# Patient Record
Sex: FEMALE | Race: BLACK OR AFRICAN AMERICAN | NOT HISPANIC OR LATINO
[De-identification: names, ages, dates, MRNs, and addresses within clinical notes are randomized per-mention and may not be internally consistent; named-entity substitution may affect disease eponyms.]

---

## 2017-06-13 ENCOUNTER — HOSPITAL (OUTPATIENT)
Dept: OTHER | Age: 32
End: 2017-06-13
Attending: OBSTETRICS & GYNECOLOGY

## 2017-06-13 LAB
ALBUMIN SERPL-MCNC: 2.3 GM/DL (ref 3.6–5.1)
ALBUMIN/GLOB SERPL: 0.5 {RATIO} (ref 1–2.4)
ALP SERPL-CCNC: 142 UNIT/L (ref 45–117)
ALT SERPL-CCNC: 16 UNIT/L
ANALYZER ANC (IANC): NORMAL
ANION GAP SERPL CALC-SCNC: 11 MMOL/L (ref 10–20)
APPEARANCE UR: CLEAR
AST SERPL-CCNC: 13 UNIT/L
BASOPHILS # BLD: 0 THOUSAND/MCL (ref 0–0.3)
BASOPHILS NFR BLD: 0 %
BILIRUB SERPL-MCNC: 0.2 MG/DL (ref 0.2–1)
BILIRUB UR QL STRIP: NEGATIVE
BUN SERPL-MCNC: 9 MG/DL (ref 6–20)
BUN/CREAT SERPL: 16 (ref 7–25)
CALCIUM SERPL-MCNC: 9.1 MG/DL (ref 8.4–10.2)
CHLORIDE: 105 MMOL/L (ref 98–107)
CO2 SERPL-SCNC: 26 MMOL/L (ref 21–32)
COLOR UR: YELLOW
CREAT SERPL-MCNC: 0.57 MG/DL (ref 0.51–0.95)
DIFFERENTIAL METHOD BLD: NORMAL
EOSINOPHIL # BLD: 0.1 THOUSAND/MCL (ref 0.1–0.5)
EOSINOPHIL NFR BLD: 1 %
ERYTHROCYTE [DISTWIDTH] IN BLOOD: 13.6 % (ref 11–15)
GLOBULIN SER-MCNC: 4.6 GM/DL (ref 2–4)
GLUCOSE SERPL-MCNC: 78 MG/DL (ref 65–99)
GLUCOSE UR STRIP-MCNC: NEGATIVE MG/DL
HEMATOCRIT: 37.4 % (ref 36–46.5)
HEMOCCULT STL QL: NEGATIVE
HGB BLD-MCNC: 12.7 GM/DL (ref 12–15.5)
KETONES UR STRIP-MCNC: NEGATIVE MG/DL
LEUKOCYTE ESTERASE UR QL STRIP: NEGATIVE
LYMPHOCYTES # BLD: 1.7 THOUSAND/MCL (ref 1–4.8)
LYMPHOCYTES NFR BLD: 17 %
MCH RBC QN AUTO: 30.9 PG (ref 26–34)
MCHC RBC AUTO-ENTMCNC: 34 GM/DL (ref 32–36.5)
MCV RBC AUTO: 91 FL (ref 78–100)
MONOCYTES # BLD: 0.7 THOUSAND/MCL (ref 0.3–0.9)
MONOCYTES NFR BLD: 7 %
NEUTROPHILS # BLD: 7.6 THOUSAND/MCL (ref 1.8–7.7)
NEUTROPHILS NFR BLD: 75 %
NEUTS SEG NFR BLD: NORMAL %
NITRITE UR QL STRIP: NEGATIVE
ORGANIC ACIDS/CREAT UR-SRTO: 40.18 MG/DL
PERCENT NRBC: NORMAL
PH UR STRIP: 6.5 UNIT (ref 5–7)
PLATELET # BLD: 244 THOUSAND/MCL (ref 140–450)
POTASSIUM SERPL-SCNC: 4.2 MMOL/L (ref 3.4–5.1)
PROT SERPL-MCNC: 6.9 GM/DL (ref 6.4–8.2)
PROT UR STRIP-MCNC: NEGATIVE MG/DL
PROT UR-MCNC: 7 MG/DL
PROT/CREAT UR: 174 MG/GM CREAT
RBC # BLD: 4.11 MILLION/MCL (ref 4–5.2)
SODIUM SERPL-SCNC: 138 MMOL/L (ref 135–145)
SP GR UR STRIP: 1.01 (ref 1–1.03)
URATE SERPL-MCNC: 2.2 MG/DL (ref 2.6–5.9)
UROBILINOGEN UR STRIP-MCNC: 0.2 MG/DL (ref 0–1)
WBC # BLD: 10.1 THOUSAND/MCL (ref 4.2–11)

## 2017-06-16 ENCOUNTER — HOSPITAL (OUTPATIENT)
Dept: OTHER | Age: 32
End: 2017-06-16
Attending: OBSTETRICS & GYNECOLOGY

## 2017-06-16 LAB
ALBUMIN SERPL-MCNC: 2.4 GM/DL (ref 3.6–5.1)
ALBUMIN/GLOB SERPL: 0.5 {RATIO} (ref 1–2.4)
ALP SERPL-CCNC: 146 UNIT/L (ref 45–117)
ALT SERPL-CCNC: 18 UNIT/L
ANALYZER ANC (IANC): ABNORMAL
ANION GAP SERPL CALC-SCNC: 13 MMOL/L (ref 10–20)
APPEARANCE UR: CLEAR
APTT PPP: 26 SECONDS (ref 22–30)
APTT PPP: NORMAL S
AST SERPL-CCNC: 12 UNIT/L
BASOPHILS # BLD: 0 THOUSAND/MCL (ref 0–0.3)
BASOPHILS NFR BLD: 0 %
BILIRUB SERPL-MCNC: 0.2 MG/DL (ref 0.2–1)
BILIRUB UR QL STRIP: NEGATIVE
BUN SERPL-MCNC: 6 MG/DL (ref 6–20)
BUN/CREAT SERPL: 10 (ref 7–25)
CALCIUM SERPL-MCNC: 9.1 MG/DL (ref 8.4–10.2)
CHLORIDE: 104 MMOL/L (ref 98–107)
CO2 SERPL-SCNC: 25 MMOL/L (ref 21–32)
COLOR UR: YELLOW
CREAT SERPL-MCNC: 0.58 MG/DL (ref 0.51–0.95)
DIFFERENTIAL METHOD BLD: ABNORMAL
EOSINOPHIL # BLD: 0.1 THOUSAND/MCL (ref 0.1–0.5)
EOSINOPHIL NFR BLD: 1 %
ERYTHROCYTE [DISTWIDTH] IN BLOOD: 13.4 % (ref 11–15)
GLOBULIN SER-MCNC: 4.5 GM/DL (ref 2–4)
GLUCOSE SERPL-MCNC: 77 MG/DL (ref 65–99)
GLUCOSE UR STRIP-MCNC: NEGATIVE MG/DL
HEMATOCRIT: 39.3 % (ref 36–46.5)
HEMOCCULT STL QL: NEGATIVE
HGB BLD-MCNC: 13.3 GM/DL (ref 12–15.5)
INR PPP: 0.9
KETONES UR STRIP-MCNC: NEGATIVE MG/DL
LEUKOCYTE ESTERASE UR QL STRIP: NEGATIVE
LYMPHOCYTES # BLD: 1.7 THOUSAND/MCL (ref 1–4.8)
LYMPHOCYTES NFR BLD: 14 %
MCH RBC QN AUTO: 30.4 PG (ref 26–34)
MCHC RBC AUTO-ENTMCNC: 33.8 GM/DL (ref 32–36.5)
MCV RBC AUTO: 89.7 FL (ref 78–100)
MONOCYTES # BLD: 1 THOUSAND/MCL (ref 0.3–0.9)
MONOCYTES NFR BLD: 8 %
NEUTROPHILS # BLD: 9.7 THOUSAND/MCL (ref 1.8–7.7)
NEUTROPHILS NFR BLD: 77 %
NEUTS SEG NFR BLD: ABNORMAL %
NITRITE UR QL STRIP: NEGATIVE
ORGANIC ACIDS/CREAT UR-SRTO: 53.28 MG/DL
PERCENT NRBC: ABNORMAL
PH UR STRIP: 7 UNIT (ref 5–7)
PLATELET # BLD: 280 THOUSAND/MCL (ref 140–450)
POTASSIUM SERPL-SCNC: 3.8 MMOL/L (ref 3.4–5.1)
PROT SERPL-MCNC: 6.9 GM/DL (ref 6.4–8.2)
PROT UR STRIP-MCNC: NEGATIVE MG/DL
PROT UR-MCNC: 11 MG/DL
PROT/CREAT UR: 206 MG/GM CREAT
PROTHROMBIN TIME: 9.9 SECONDS (ref 9.7–11.8)
PROTHROMBIN TIME: NORMAL
RBC # BLD: 4.38 MILLION/MCL (ref 4–5.2)
SODIUM SERPL-SCNC: 138 MMOL/L (ref 135–145)
SP GR UR STRIP: 1.02 (ref 1–1.03)
URATE SERPL-MCNC: 2.2 MG/DL (ref 2.6–5.9)
UROBILINOGEN UR STRIP-MCNC: 0.2 MG/DL (ref 0–1)
WBC # BLD: 12.4 THOUSAND/MCL (ref 4.2–11)

## 2017-06-17 LAB
BASE DEFICIT BLDCOA-SCNC: 1 MMOL/L
BASE DEFICIT BLDCOV-SCNC: NORMAL MMOL/L
BASE EXCESS BLDCOA CALC-SCNC: NORMAL MMOL/L
BASE EXCESS-RC: 0 MMOL/L
HCO3 BLDCOA-SCNC: 26 MMOL/L (ref 21–28)
HCO3 BLDCOV-SCNC: 25 MMOL/L (ref 22–29)
PCO2 BLDCOA: 55 MM HG (ref 31–74)
PCO2 BLDCOV: 43 MM HG (ref 23–49)
PH BLDCOA: 7.29 UNIT (ref 7.18–7.38)
PH BLDCOV: 7.38 UNIT (ref 7.25–7.45)
PO2 BLDCOV: 30 MM HG (ref 17–41)
PO2 RC ARTERIAL CORD (RACPO): <20 MM HG (ref 6–31)

## 2017-06-18 LAB
ANALYZER ANC (IANC): ABNORMAL
ERYTHROCYTE [DISTWIDTH] IN BLOOD: 13.5 % (ref 11–15)
HEMATOCRIT: 40.1 % (ref 36–46.5)
HGB BLD-MCNC: 13.5 GM/DL (ref 12–15.5)
MCH RBC QN AUTO: 30.8 PG (ref 26–34)
MCHC RBC AUTO-ENTMCNC: 33.7 GM/DL (ref 32–36.5)
MCV RBC AUTO: 91.3 FL (ref 78–100)
PLATELET # BLD: 259 THOUSAND/MCL (ref 140–450)
RBC # BLD: 4.39 MILLION/MCL (ref 4–5.2)
WBC # BLD: 12 THOUSAND/MCL (ref 4.2–11)

## 2017-08-09 ENCOUNTER — HOSPITAL (OUTPATIENT)
Dept: OTHER | Age: 32
End: 2017-08-09
Attending: OBSTETRICS & GYNECOLOGY

## 2019-01-18 ENCOUNTER — WALK IN (OUTPATIENT)
Dept: URGENT CARE | Age: 34
End: 2019-01-18

## 2019-01-18 VITALS
TEMPERATURE: 98 F | DIASTOLIC BLOOD PRESSURE: 96 MMHG | HEART RATE: 76 BPM | OXYGEN SATURATION: 99 % | SYSTOLIC BLOOD PRESSURE: 142 MMHG | RESPIRATION RATE: 16 BRPM

## 2019-01-18 DIAGNOSIS — R03.0 ELEVATED BLOOD-PRESSURE READING WITHOUT DIAGNOSIS OF HYPERTENSION: ICD-10-CM

## 2019-01-18 DIAGNOSIS — J01.40 ACUTE NON-RECURRENT PANSINUSITIS: Primary | ICD-10-CM

## 2019-01-18 PROCEDURE — 99203 OFFICE O/P NEW LOW 30 MIN: CPT | Performed by: NURSE PRACTITIONER

## 2019-01-18 RX ORDER — AZITHROMYCIN 250 MG/1
TABLET, FILM COATED ORAL
Qty: 6 TABLET | Refills: 0 | Status: SHIPPED | OUTPATIENT
Start: 2019-01-18

## 2019-01-18 ASSESSMENT — ENCOUNTER SYMPTOMS
ENDOCRINE NEGATIVE: 1
GASTROINTESTINAL NEGATIVE: 1
CHEST TIGHTNESS: 0
NEUROLOGICAL NEGATIVE: 1
ALLERGIC/IMMUNOLOGIC NEGATIVE: 1
SHORTNESS OF BREATH: 0
FEVER: 0
EYES NEGATIVE: 1
APPETITE CHANGE: 1
WEAKNESS: 0
ADENOPATHY: 0
WHEEZING: 0
COUGH: 0
PSYCHIATRIC NEGATIVE: 1
SINUS PRESSURE: 1
SORE THROAT: 1

## 2019-01-19 ENCOUNTER — TELEPHONE (OUTPATIENT)
Dept: SCHEDULING | Age: 34
End: 2019-01-19

## 2019-01-19 ENCOUNTER — DOCUMENTATION (OUTPATIENT)
Dept: URGENT CARE | Age: 34
End: 2019-01-19

## 2022-01-24 ENCOUNTER — HOSPITAL ENCOUNTER (OUTPATIENT)
Age: 37
Discharge: HOME | End: 2022-01-24
Payer: MEDICAID

## 2022-01-24 VITALS
SYSTOLIC BLOOD PRESSURE: 139 MMHG | OXYGEN SATURATION: 100 % | RESPIRATION RATE: 18 BRPM | DIASTOLIC BLOOD PRESSURE: 98 MMHG | TEMPERATURE: 99 F | HEART RATE: 98 BPM

## 2022-01-24 DIAGNOSIS — U07.1 COVID: ICD-10-CM

## 2022-01-24 DIAGNOSIS — Z20.822 ENCOUNTER FOR LABORATORY TESTING FOR COVID-19 VIRUS: Primary | ICD-10-CM

## 2022-01-24 LAB — SARS-COV-2 RNA RESP QL NAA+PROBE: DETECTED

## 2022-01-24 PROCEDURE — U0002 COVID-19 LAB TEST NON-CDC: HCPCS | Performed by: NURSE PRACTITIONER

## 2022-01-24 PROCEDURE — 99213 OFFICE O/P EST LOW 20 MIN: CPT | Performed by: NURSE PRACTITIONER

## 2022-01-24 NOTE — ED PROVIDER NOTES
Patient Seen in: [facility]      History   Patient presents with:  Headache    Stated Complaint: covid test/chills/fever/ha/    Subjective:   HPI    27-year-old female presents with headache that began yesterday.   Her daughter is positive for [id number]  [phone]                Medications Prescribed:  Discharge Medication List as of 1/24/2022 10:40 AM

## 2022-06-07 ENCOUNTER — WALK IN (OUTPATIENT)
Dept: URGENT CARE | Age: 37
End: 2022-06-07

## 2022-06-07 VITALS
HEART RATE: 80 BPM | SYSTOLIC BLOOD PRESSURE: 132 MMHG | DIASTOLIC BLOOD PRESSURE: 80 MMHG | TEMPERATURE: 98.6 F | RESPIRATION RATE: 16 BRPM

## 2022-06-07 DIAGNOSIS — J01.90 ACUTE BACTERIAL SINUSITIS: Primary | ICD-10-CM

## 2022-06-07 DIAGNOSIS — B96.89 ACUTE BACTERIAL SINUSITIS: Primary | ICD-10-CM

## 2022-06-07 PROCEDURE — 99213 OFFICE O/P EST LOW 20 MIN: CPT | Performed by: NURSE PRACTITIONER

## 2022-06-07 RX ORDER — AMOXICILLIN AND CLAVULANATE POTASSIUM 875; 125 MG/1; MG/1
1 TABLET, FILM COATED ORAL EVERY 12 HOURS
Qty: 20 TABLET | Refills: 0 | Status: SHIPPED | OUTPATIENT
Start: 2022-06-07 | End: 2022-06-17

## 2022-06-07 ASSESSMENT — ENCOUNTER SYMPTOMS
RESPIRATORY NEGATIVE: 1
HEADACHES: 0
FATIGUE: 1
SORE THROAT: 0
TROUBLE SWALLOWING: 0
DIZZINESS: 0
GASTROINTESTINAL NEGATIVE: 1
FEVER: 0
LIGHT-HEADEDNESS: 0
SINUS PRESSURE: 1
EYES NEGATIVE: 1
RHINORRHEA: 1

## 2022-11-11 ENCOUNTER — OFFICE VISIT (OUTPATIENT)
Dept: OBGYN CLINIC | Facility: CLINIC | Age: 37
End: 2022-11-11
Payer: MEDICAID

## 2022-11-11 VITALS
WEIGHT: 153.81 LBS | SYSTOLIC BLOOD PRESSURE: 142 MMHG | HEART RATE: 88 BPM | BODY MASS INDEX: 23 KG/M2 | DIASTOLIC BLOOD PRESSURE: 90 MMHG

## 2022-11-11 DIAGNOSIS — Z12.4 SCREENING FOR CERVICAL CANCER: ICD-10-CM

## 2022-11-11 DIAGNOSIS — Z01.419 WELL WOMAN EXAM WITH ROUTINE GYNECOLOGICAL EXAM: Primary | ICD-10-CM

## 2022-11-11 DIAGNOSIS — D21.9 FIBROIDS: ICD-10-CM

## 2022-11-11 DIAGNOSIS — G43.829 MENSTRUAL MIGRAINE WITHOUT STATUS MIGRAINOSUS, NOT INTRACTABLE: ICD-10-CM

## 2022-11-11 DIAGNOSIS — N94.10 DYSPAREUNIA IN FEMALE: ICD-10-CM

## 2022-11-11 DIAGNOSIS — I10 HYPERTENSION, UNSPECIFIED TYPE: ICD-10-CM

## 2022-11-11 PROCEDURE — 3080F DIAST BP >= 90 MM HG: CPT | Performed by: NURSE PRACTITIONER

## 2022-11-11 PROCEDURE — 99385 PREV VISIT NEW AGE 18-39: CPT | Performed by: NURSE PRACTITIONER

## 2022-11-11 PROCEDURE — 3077F SYST BP >= 140 MM HG: CPT | Performed by: NURSE PRACTITIONER

## 2022-11-12 PROBLEM — D21.9 FIBROIDS: Status: ACTIVE | Noted: 2022-11-12

## 2022-11-12 PROBLEM — I10 HYPERTENSION: Status: ACTIVE | Noted: 2022-11-12

## 2022-11-14 LAB — HPV I/H RISK 1 DNA SPEC QL NAA+PROBE: NEGATIVE

## 2022-11-23 ENCOUNTER — TELEPHONE (OUTPATIENT)
Dept: OBGYN CLINIC | Facility: CLINIC | Age: 37
End: 2022-11-23

## 2022-12-11 ENCOUNTER — HOSPITAL ENCOUNTER (OUTPATIENT)
Dept: ULTRASOUND IMAGING | Age: 37
Discharge: HOME | End: 2022-12-11
Attending: NURSE PRACTITIONER
Payer: MEDICAID

## 2022-12-11 ENCOUNTER — HOSPITAL ENCOUNTER (OUTPATIENT)
Dept: ULTRASOUND IMAGING | Age: 37
End: 2022-12-11
Attending: NURSE PRACTITIONER
Payer: MEDICAID

## 2022-12-11 DIAGNOSIS — D21.9 FIBROIDS: ICD-10-CM

## 2022-12-11 DIAGNOSIS — N94.10 DYSPAREUNIA IN FEMALE: ICD-10-CM

## 2022-12-11 PROCEDURE — 76856 US EXAM PELVIC COMPLETE: CPT | Performed by: NURSE PRACTITIONER

## 2022-12-11 PROCEDURE — 76830 TRANSVAGINAL US NON-OB: CPT | Performed by: NURSE PRACTITIONER

## 2023-09-01 ENCOUNTER — HOSPITAL ENCOUNTER (OUTPATIENT)
Age: 38
Discharge: HOME | End: 2023-09-01
Payer: MEDICAID

## 2023-09-01 VITALS
HEART RATE: 84 BPM | TEMPERATURE: 98 F | OXYGEN SATURATION: 99 % | DIASTOLIC BLOOD PRESSURE: 103 MMHG | SYSTOLIC BLOOD PRESSURE: 146 MMHG | RESPIRATION RATE: 20 BRPM

## 2023-09-01 DIAGNOSIS — J06.9 VIRAL UPPER RESPIRATORY TRACT INFECTION: Primary | ICD-10-CM

## 2023-09-01 DIAGNOSIS — Z20.822 ENCOUNTER FOR LABORATORY TESTING FOR COVID-19 VIRUS: ICD-10-CM

## 2023-09-01 LAB
S PYO AG THROAT QL: NEGATIVE
SARS-COV-2 RNA RESP QL NAA+PROBE: NOT DETECTED

## 2024-02-06 ENCOUNTER — WALK IN (OUTPATIENT)
Dept: URGENT CARE | Age: 39
End: 2024-02-06

## 2024-02-06 VITALS
RESPIRATION RATE: 18 BRPM | HEIGHT: 68 IN | HEART RATE: 73 BPM | OXYGEN SATURATION: 98 % | TEMPERATURE: 98.2 F | BODY MASS INDEX: 22.43 KG/M2 | DIASTOLIC BLOOD PRESSURE: 99 MMHG | SYSTOLIC BLOOD PRESSURE: 136 MMHG | WEIGHT: 148 LBS

## 2024-02-06 DIAGNOSIS — B96.89 ACUTE BACTERIAL RHINOSINUSITIS: Primary | ICD-10-CM

## 2024-02-06 DIAGNOSIS — J01.90 ACUTE BACTERIAL RHINOSINUSITIS: Primary | ICD-10-CM

## 2024-02-06 DIAGNOSIS — R03.0 ELEVATED BP WITHOUT DIAGNOSIS OF HYPERTENSION: ICD-10-CM

## 2024-02-06 PROCEDURE — 99213 OFFICE O/P EST LOW 20 MIN: CPT | Performed by: NURSE PRACTITIONER

## 2024-02-06 RX ORDER — AMOXICILLIN AND CLAVULANATE POTASSIUM 875; 125 MG/1; MG/1
1 TABLET, FILM COATED ORAL 2 TIMES DAILY
Qty: 20 TABLET | Refills: 0 | Status: SHIPPED | OUTPATIENT
Start: 2024-02-06 | End: 2024-02-16

## 2025-02-12 ENCOUNTER — APPOINTMENT (OUTPATIENT)
Dept: URBAN - METROPOLITAN AREA CLINIC 248 | Age: 40
Setting detail: DERMATOLOGY
End: 2025-02-12

## 2025-02-12 DIAGNOSIS — L70.0 ACNE VULGARIS: ICD-10-CM

## 2025-02-12 DIAGNOSIS — L81.4 OTHER MELANIN HYPERPIGMENTATION: ICD-10-CM

## 2025-02-12 PROCEDURE — 99203 OFFICE O/P NEW LOW 30 MIN: CPT

## 2025-02-12 PROCEDURE — OTHER PRESCRIPTION: OTHER

## 2025-02-12 PROCEDURE — OTHER ADDITIONAL NOTES: OTHER

## 2025-02-12 PROCEDURE — OTHER COUNSELING: OTHER

## 2025-02-12 PROCEDURE — OTHER MIPS QUALITY: OTHER

## 2025-02-12 RX ORDER — H-QUINONE/TRETINOIN/HYDROCORT 8 %-0.025%
EMULSION (GRAM) TOPICAL
Qty: 30 | Refills: 0 | Status: ERX | COMMUNITY
Start: 2025-02-12

## 2025-02-12 NOTE — HPI: PIMPLES (ACNE)
What Type Of Note Output Would You Prefer (Optional)?: Standard Output
How Severe Is Your Acne?: moderate
Is This A New Presentation, Or A Follow-Up?: Acne
Additional Comments (Use Complete Sentences): Pt PCP prescribed spironolactone for high BP and acne. Pt is also on anxiety medication.\\n

## 2025-02-12 NOTE — PROCEDURE: ADDITIONAL NOTES
Additional Notes: Pt picked up Kutaryaxm in office.
Render Risk Assessment In Note?: no
Detail Level: Simple
Additional Notes: Pt is taking spironolactone (50 mg 1x day) for past 2-3 weeks and is being managed by their PCP.

## 2025-02-12 NOTE — PROCEDURE: COUNSELING
Detail Level: Zone
Birth Control Pills Counseling: Birth Control Pill Counseling: I discussed with the patient the potential side effects of OCPs including but not limited to increased risk of stroke, heart attack, thrombophlebitis, deep venous thrombosis, hepatic adenomas, breast changes, GI upset, headaches, and depression.  The patient verbalized understanding of the proper use and possible adverse effects of OCPs. All of the patient's questions and concerns were addressed.
Topical Retinoid Pregnancy And Lactation Text: This medication is Pregnancy Category C. It is unknown if this medication is excreted in breast milk.
Topical Clindamycin Pregnancy And Lactation Text: This medication is Pregnancy Category B and is considered safe during pregnancy. It is unknown if it is excreted in breast milk.
Minocycline Pregnancy And Lactation Text: This medication is Pregnancy Category D and not consider safe during pregnancy. It is also excreted in breast milk.
Aklief counseling:  Patient advised to apply a pea-sized amount only at bedtime and wait 30 minutes after washing their face before applying.  If too drying, patient may add a non-comedogenic moisturizer.  The most commonly reported side effects including irritation, redness, scaling, dryness, stinging, burning, itching, and increased risk of sunburn.  The patient verbalized understanding of the proper use and possible adverse effects of retinoids.  All of the patient's questions and concerns were addressed.
Azelaic Acid Counseling: Patient counseled that medicine may cause skin irritation and to avoid applying near the eyes.  In the event of skin irritation, the patient was advised to reduce the amount of the drug applied or use it less frequently.   The patient verbalized understanding of the proper use and possible adverse effects of azelaic acid.  All of the patient's questions and concerns were addressed.
Dapsone Counseling: I discussed with the patient the risks of dapsone including but not limited to hemolytic anemia, agranulocytosis, rashes, methemoglobinemia, kidney failure, peripheral neuropathy, headaches, GI upset, and liver toxicity.  Patients who start dapsone require monitoring including baseline LFTs and weekly CBCs for the first month, then every month thereafter.  The patient verbalized understanding of the proper use and possible adverse effects of dapsone.  All of the patient's questions and concerns were addressed.
Erythromycin Counseling:  I discussed with the patient the risks of erythromycin including but not limited to GI upset, allergic reaction, drug rash, diarrhea, increase in liver enzymes, and yeast infections.
Tazorac Pregnancy And Lactation Text: This medication is not safe during pregnancy. It is unknown if this medication is excreted in breast milk.
Spironolactone Counseling: Patient advised regarding risks of diarrhea, abdominal pain, hyperkalemia, birth defects (for female patients), liver toxicity and renal toxicity. The patient may need blood work to monitor liver and kidney function and potassium levels while on therapy. The patient verbalized understanding of the proper use and possible adverse effects of spironolactone.  All of the patient's questions and concerns were addressed.
Azelaic Acid Pregnancy And Lactation Text: This medication is considered safe during pregnancy and breast feeding.
Azithromycin Counseling:  I discussed with the patient the risks of azithromycin including but not limited to GI upset, allergic reaction, drug rash, diarrhea, and yeast infections.
Winlevi Counseling:  I discussed with the patient the risks of topical clascoterone including but not limited to erythema, scaling, itching, and stinging. Patient voiced their understanding.
Sunscreen Recommendations: spf 30 - 50 daily with reapplication
Bactrim Counseling:  I discussed with the patient the risks of sulfa antibiotics including but not limited to GI upset, allergic reaction, drug rash, diarrhea, dizziness, photosensitivity, and yeast infections.  Rarely, more serious reactions can occur including but not limited to aplastic anemia, agranulocytosis, methemoglobinemia, blood dyscrasias, liver or kidney failure, lung infiltrates or desquamative/blistering drug rashes.
Isotretinoin Pregnancy And Lactation Text: This medication is Pregnancy Category X and is considered extremely dangerous during pregnancy. It is unknown if it is excreted in breast milk.
High Dose Vitamin A Pregnancy And Lactation Text: High dose vitamin A therapy is contraindicated during pregnancy and breast feeding.
Tetracycline Counseling: Patient counseled regarding possible photosensitivity and increased risk for sunburn.  Patient instructed to avoid sunlight, if possible.  When exposed to sunlight, patients should wear protective clothing, sunglasses, and sunscreen.  The patient was instructed to call the office immediately if the following severe adverse effects occur:  hearing changes, easy bruising/bleeding, severe headache, or vision changes.  The patient verbalized understanding of the proper use and possible adverse effects of tetracycline.  All of the patient's questions and concerns were addressed. Patient understands to avoid pregnancy while on therapy due to potential birth defects.
Benzoyl Peroxide Pregnancy And Lactation Text: This medication is Pregnancy Category C. It is unknown if benzoyl peroxide is excreted in breast milk.
Cleanser Recommendations: Gentle unscented cleanser such as cetaphil/cerave hydrating
Minocycline Counseling: Patient advised regarding possible photosensitivity and discoloration of the teeth, skin, lips, tongue and gums.  Patient instructed to avoid sunlight, if possible.  When exposed to sunlight, patients should wear protective clothing, sunglasses, and sunscreen.  The patient was instructed to call the office immediately if the following severe adverse effects occur:  hearing changes, easy bruising/bleeding, severe headache, or vision changes.  The patient verbalized understanding of the proper use and possible adverse effects of minocycline.  All of the patient's questions and concerns were addressed.
Topical Clindamycin Counseling: Patient counseled that this medication may cause skin irritation or allergic reactions.  In the event of skin irritation, the patient was advised to reduce the amount of the drug applied or use it less frequently.   The patient verbalized understanding of the proper use and possible adverse effects of clindamycin.  All of the patient's questions and concerns were addressed.
Tazorac Counseling:  Patient advised that medication is irritating and drying.  Patient may need to apply sparingly and wash off after an hour before eventually leaving it on overnight.  The patient verbalized understanding of the proper use and possible adverse effects of tazorac.  All of the patient's questions and concerns were addressed.
Aklief Pregnancy And Lactation Text: It is unknown if this medication is safe to use during pregnancy.  It is unknown if this medication is excreted in breast milk.  Breastfeeding women should use the topical cream on the smallest area of the skin for the shortest time needed while breastfeeding.  Do not apply to nipple and areola.
Topical Sulfur Applications Counseling: Topical Sulfur Counseling: Patient counseled that this medication may cause skin irritation or allergic reactions.  In the event of skin irritation, the patient was advised to reduce the amount of the drug applied or use it less frequently.   The patient verbalized understanding of the proper use and possible adverse effects of topical sulfur application.  All of the patient's questions and concerns were addressed.
Birth Control Pills Pregnancy And Lactation Text: This medication should be avoided if pregnant and for the first 30 days post-partum.
Doxycycline Pregnancy And Lactation Text: This medication is Pregnancy Category D and not consider safe during pregnancy. It is also excreted in breast milk but is considered safe for shorter treatment courses.
Erythromycin Pregnancy And Lactation Text: This medication is Pregnancy Category B and is considered safe during pregnancy. It is also excreted in breast milk.
Dapsone Pregnancy And Lactation Text: This medication is Pregnancy Category C and is not considered safe during pregnancy or breast feeding.
Sarecycline Counseling: Patient advised regarding possible photosensitivity and discoloration of the teeth, skin, lips, tongue and gums.  Patient instructed to avoid sunlight, if possible.  When exposed to sunlight, patients should wear protective clothing, sunglasses, and sunscreen.  The patient was instructed to call the office immediately if the following severe adverse effects occur:  hearing changes, easy bruising/bleeding, severe headache, or vision changes.  The patient verbalized understanding of the proper use and possible adverse effects of sarecycline.  All of the patient's questions and concerns were addressed.
Doxycycline Counseling:  Patient counseled regarding possible photosensitivity and increased risk for sunburn.  Patient instructed to avoid sunlight, if possible.  When exposed to sunlight, patients should wear protective clothing, sunglasses, and sunscreen.  The patient was instructed to call the office immediately if the following severe adverse effects occur:  hearing changes, easy bruising/bleeding, severe headache, or vision changes.  The patient verbalized understanding of the proper use and possible adverse effects of doxycycline.  All of the patient's questions and concerns were addressed.
Topical Sulfur Applications Pregnancy And Lactation Text: This medication is Pregnancy Category C and has an unknown safety profile during pregnancy. It is unknown if this topical medication is excreted in breast milk.
Isotretinoin Counseling: Patient should get monthly blood tests, not donate blood, not drive at night if vision affected, not share medication, and not undergo elective surgery for 6 months after tx completed. Side effects reviewed, pt to contact office should one occur.
Moisturizer Recommendations: cetaphil/cerave cream, neutrogena hydroboost
Use Enhanced Medication Counseling?: No
Winlevi Pregnancy And Lactation Text: This medication is considered safe during pregnancy and breastfeeding.
Spironolactone Pregnancy And Lactation Text: This medication can cause feminization of the male fetus and should be avoided during pregnancy. The active metabolite is also found in breast milk.
Benzoyl Peroxide Counseling: Patient counseled that medicine may cause skin irritation and bleach clothing.  In the event of skin irritation, the patient was advised to reduce the amount of the drug applied or use it less frequently.   The patient verbalized understanding of the proper use and possible adverse effects of benzoyl peroxide.  All of the patient's questions and concerns were addressed.
Azithromycin Pregnancy And Lactation Text: This medication is considered safe during pregnancy and is also secreted in breast milk.
Topical Retinoid counseling:  Patient advised to apply a pea-sized amount only at bedtime and wait 30 minutes after washing their face before applying.  If too drying, patient may add a non-comedogenic moisturizer. The patient verbalized understanding of the proper use and possible adverse effects of retinoids.  All of the patient's questions and concerns were addressed.
High Dose Vitamin A Counseling: Side effects reviewed, pt to contact office should one occur.
Bactrim Pregnancy And Lactation Text: This medication is Pregnancy Category D and is known to cause fetal risk.  It is also excreted in breast milk.
Detail Level: Simple

## 2025-02-27 ENCOUNTER — LAB ENCOUNTER (OUTPATIENT)
Dept: LAB | Facility: HOSPITAL | Age: 40
End: 2025-02-27
Attending: NURSE PRACTITIONER
Payer: MEDICAID

## 2025-02-27 ENCOUNTER — OFFICE VISIT (OUTPATIENT)
Dept: OBGYN CLINIC | Facility: CLINIC | Age: 40
End: 2025-02-27

## 2025-02-27 VITALS
WEIGHT: 148.81 LBS | HEART RATE: 94 BPM | SYSTOLIC BLOOD PRESSURE: 133 MMHG | BODY MASS INDEX: 22 KG/M2 | DIASTOLIC BLOOD PRESSURE: 87 MMHG

## 2025-02-27 DIAGNOSIS — Z01.419 WELL WOMAN EXAM WITH ROUTINE GYNECOLOGICAL EXAM: Primary | ICD-10-CM

## 2025-02-27 DIAGNOSIS — Z12.31 SCREENING MAMMOGRAM FOR BREAST CANCER: ICD-10-CM

## 2025-02-27 DIAGNOSIS — D21.9 FIBROIDS: ICD-10-CM

## 2025-02-27 DIAGNOSIS — N92.0 MENORRHAGIA WITH REGULAR CYCLE: ICD-10-CM

## 2025-02-27 DIAGNOSIS — Z12.4 SCREENING FOR CERVICAL CANCER: ICD-10-CM

## 2025-02-27 LAB
DEPRECATED RDW RBC AUTO: 47.2 FL (ref 35.1–46.3)
ERYTHROCYTE [DISTWIDTH] IN BLOOD BY AUTOMATED COUNT: 14.1 % (ref 11–15)
HCT VFR BLD AUTO: 38.8 %
HGB BLD-MCNC: 13 G/DL
MCH RBC QN AUTO: 30.4 PG (ref 26–34)
MCHC RBC AUTO-ENTMCNC: 33.5 G/DL (ref 31–37)
MCV RBC AUTO: 90.7 FL
PLATELET # BLD AUTO: 329 10(3)UL (ref 150–450)
RBC # BLD AUTO: 4.28 X10(6)UL
T3FREE SERPL-MCNC: 3.06 PG/ML (ref 2.4–4.2)
T4 FREE SERPL-MCNC: 1.1 NG/DL (ref 0.8–1.7)
TSI SER-ACNC: 0.53 UIU/ML (ref 0.55–4.78)
WBC # BLD AUTO: 5.8 X10(3) UL (ref 4–11)

## 2025-02-27 PROCEDURE — 84481 FREE ASSAY (FT-3): CPT | Performed by: NURSE PRACTITIONER

## 2025-02-27 PROCEDURE — 84439 ASSAY OF FREE THYROXINE: CPT | Performed by: NURSE PRACTITIONER

## 2025-02-27 PROCEDURE — 84443 ASSAY THYROID STIM HORMONE: CPT | Performed by: NURSE PRACTITIONER

## 2025-02-27 PROCEDURE — 36415 COLL VENOUS BLD VENIPUNCTURE: CPT

## 2025-02-27 PROCEDURE — 85027 COMPLETE CBC AUTOMATED: CPT

## 2025-02-27 PROCEDURE — 99395 PREV VISIT EST AGE 18-39: CPT | Performed by: NURSE PRACTITIONER

## 2025-02-27 RX ORDER — SPIRONOLACTONE 50 MG/1
50 TABLET, FILM COATED ORAL DAILY
COMMUNITY
Start: 2025-01-23 | End: 2025-04-23

## 2025-02-27 NOTE — PROGRESS NOTES
[facility]    Obstetrics and Gynecology    Chief Complaint   Patient presents with    Gyn Exam     Annual // Discuss possible fibroids per pt       Sonja Collins is a 39 year old female  Patient's last menstrual period was 2025 (approximate). presenting for annual gynecology exam.  Last seen 2022. Saw PCP in 2024.    On spironolactone for acne and also for BP (diastolic bp was elevated).    Prior to having her son she had 2 fibroids and heavy periods.post deliver states periods less heavy. In the last 9 months periods are heavier. Menses are monthly, menses last 5 days, first 2 days \"extremely heavy\" -she will use an ultra tampon and period underwear and within less than 30 min or an hour needs to change again. Also had mild cramping after her periods.  +painful periods, \"like having a baby\". Doesn't take pain meds because feels it makes bleeding heavier.    Hx of hemorrhoids - but noticing flare. She will notice tail bone pain when sitting. Normal bowel movements, occasional constipation.    Exercising regularly and weight training.    Sexually active with her , tubal ligation. Some bleeding with intercourse.    Pap:2018 ASCUS, HPV negative (records scanned in)   pap 2020 NILM, neg HPV  pap with EMB done 2022 NILM, neg human papillomavirus     Contraception:tubal ligation  Mammo: never    OBSTETRICS HISTORY:  OB History    Para Term  AB Living   2 2 2 0 0 2   SAB IAB Ectopic Multiple Live Births   0 0 0 0 2       GYNE HISTORY:  Menarche: 12 (2025  9:50 AM)  Period Cycle (Days): MONTHLY // 28 days (2025  9:50 AM)  Period Duration (Days): 5 DAYS (2025  9:50 AM)  Period Flow: Heavy (2025  9:50 AM)  Use of Birth Control (if yes, specify type): Tubal Ligation (2025  9:50 AM)  Pap Date: 22 (2025  9:50 AM)  Pap Result Notes: Neg Pap/HPV (2025  9:50 AM)  Follow Up Recommendation: Annual 22 EMB (2025  9:50  AM)      History   Sexual Activity    Sexual activity: Yes    Partners: Male           Latest Ref Rng & Units 11/11/2022     3:17 PM   RECENT PAP RESULTS   INTERPRETATION/RESULT: Negative for intraepithelial lesion or malignancy Negative for intraepithelial lesion or malignancy    HPV Negative Negative          MEDICAL HISTORY:  Past Medical History:    Anxiety    Dyspareunia    Fibroids    Hypertension     Past Surgical History:   Procedure Laterality Date    Insert intrauterine device      Remove intrauterine device      Tubal ligation         SOCIAL HISTORY:  Social History     Socioeconomic History    Marital status:      Spouse name: Not on file    Number of children: Not on file    Years of education: Not on file    Highest education level: Not on file   Occupational History    Not on file   Tobacco Use    Smoking status: Never    Smokeless tobacco: Not on file   Vaping Use    Vaping status: Never Used   Substance and Sexual Activity    Alcohol use: Yes     Alcohol/week: 1.0 standard drink of alcohol     Types: 1 Glasses of wine per week     Comment: OCC    Drug use: Never    Sexual activity: Yes     Partners: Male   Other Topics Concern    Not on file   Social History Narrative    Not on file     Social Drivers of Health     Food Insecurity: No Food Insecurity (1/23/2025)    Received from [facility]    Food Insecurity     Within the past 30 days, I worried whether my food would run out before I got money to buy more. / En los últimos 30 días, me preocupó que la comida se podía acabar antes de tener dinero para compr...: Never true / Nunca     Within the past 30 days, the food that I bought just didn't last, and I didn't have money to get more. / En los últimos 30 días, La comida que compré no rindió lo suficiente, y no tenía dinero para...: Never true / Nunca   Transportation Needs: Not on file   Stress: Not on file   Housing Stability: Low Risk  (11/16/2024)    [facility]    Housing Stability     Mortgage Payment Concerns?: Not on file     Number of Places Lived in the Last Year: Not on file     Unstable Housing?: Not on file         Depression Screening (PHQ-2/PHQ-9): Over the LAST 2 WEEKS   Little interest or pleasure in doing things: Not at all    Feeling down, depressed, or hopeless: Not at all    PHQ-2 SCORE: 0           FAMILY HISTORY:  Family History   Problem Relation Age of Onset    Hypertension Mother     Lipids Mother     Diabetes Father     Hypertension Father     Heart Disease Paternal Grandmother        MEDICATIONS:    Current Outpatient Medications:     spironolactone 50 MG Oral Tab, Take 1 tablet (50 mg total) by mouth daily., Disp: , Rfl:     ALLERGIES:  Allergies[1]      Review of Systems:  Constitutional:  Denies fatigue, night sweats, hot flashes  Eyes:  denies blurred or double vision  Cardiovascular:  denies chest pain or palpitations  Respiratory:  denies shortness of breath  Gastrointestinal:  denies heartburn, abdominal pain, diarrhea or constipation  Genitourinary:  denies dysuria, incontinence, abnormal vaginal discharge, vaginal itching, see HPI  Musculoskeletal:  denies back pain   Skin/Breast:  Denies any breast pain, lumps, or discharge.   Neurological:  denies headaches, extremity weakness or numbness.  Psychiatric: denies depression or anxiety.  Endocrine:   denies excessive thirst or urination.  Heme/Lymph:  denies history of anemia, easy bruising or bleeding.      PHYSICAL EXAM:     Vitals:    02/27/25 0953   BP: 133/87   Pulse: 94   Weight: 148 lb 12.8 oz (67.5 kg)       Body mass index is 22.3 kg/m².     Patient offered chaperone, patient declined    Constitutional: well developed, well nourished  Psychiatric:  Oriented to time, place, person and situation. Appropriate mood and affect  Head/Face: normocephalic  Neck/Thyroid: thyroid symmetric, no thyromegaly, no nodules, no adenopathy  Lymphatic:no abnormal  supraclavicular or axillary adenopathy is noted  Breast: normal without palpable masses, tenderness, asymmetry, nipple discharge, nipple retraction or skin changes  Abdomen:  soft, nontender, nondistended, no masses  Skin/Hair: no unusual rashes or bruises  Extremities: no edema, no cyanosis    Pelvic Exam:  External Genitalia: normal appearance, hair distribution, and no lesions  Urethral Meatus:  normal in size, location, without lesions and prolapse  Bladder:  No fullness, masses or tenderness  Vagina:  Normal appearance without lesions, no abnormal discharge  Cervix:  Normal without tenderness on motion  Uterus: 12 week size, irregular contour, position, mobility, without tenderness  Adnexa: normal without masses or tenderness  Perineum: normal  Anus: no hemorroids     Assessment & Plan:    ICD-10-CM    1. Well woman exam with routine gynecological exam  Z01.419       2. Screening for cervical cancer  Z12.4 ThinPrep PAP Smear     Hpv Dna  High Risk , Thin Prep Collect      3. Screening mammogram for breast cancer  Z12.31 FLAQUITO TANJA 2D+3D SCREENING BILAT (CPT=77067/10121)      4. Menorrhagia with regular cycle  N92.0 TSH W Reflex To Free T4     CBC, Platelet; No Differential     US PELVIS (TRANSABDOMINAL AND TRANSVAGINAL) (CPT=76856/21293)      5. Fibroids  D21.9 TSH W Reflex To Free T4     CBC, Platelet; No Differential         Reviewed ASCCP guidelines with the patient   Pap done today  Contraception: Using tubal ligation  Enlarged uterus - hx of fibroids - last ultrasound in 2022 she had a 1.3 cm posterior uterine fibroid.. repeat ultrasound ordered and labs  Breast Health:     Reviewed current guidelines with the patient and to start Mammograms at age 40  Reviewed monthly self breast exams with the patient   Discussed diet, exercise, MVIs with Ca/Vit D  Follow up in 1 yr for SHARON Armstrong    This note was prepared using Dragon Medical voice recognition dictation software. As a result errors may  occur. When identified these errors have been corrected. While every attempt is made to correct errors during dictation discrepancies may still exist.         [1] No Known Allergies

## 2025-02-28 DIAGNOSIS — N92.0 MENORRHAGIA WITH REGULAR CYCLE: Primary | ICD-10-CM

## 2025-02-28 DIAGNOSIS — D21.9 FIBROIDS: ICD-10-CM

## 2025-02-28 LAB — HPV E6+E7 MRNA CVX QL NAA+PROBE: NEGATIVE

## 2025-03-04 ENCOUNTER — PATIENT MESSAGE (OUTPATIENT)
Dept: OBGYN CLINIC | Facility: CLINIC | Age: 40
End: 2025-03-04

## 2025-03-04 ENCOUNTER — HOSPITAL ENCOUNTER (OUTPATIENT)
Dept: ULTRASOUND IMAGING | Age: 40
Discharge: HOME | End: 2025-03-04
Attending: NURSE PRACTITIONER
Payer: MEDICAID

## 2025-03-04 DIAGNOSIS — N92.0 MENORRHAGIA WITH REGULAR CYCLE: ICD-10-CM

## 2025-03-04 PROCEDURE — 76830 TRANSVAGINAL US NON-OB: CPT | Performed by: NURSE PRACTITIONER

## 2025-03-04 PROCEDURE — 76856 US EXAM PELVIC COMPLETE: CPT | Performed by: NURSE PRACTITIONER

## 2025-03-04 NOTE — TELEPHONE ENCOUNTER
Message to Sandra Caban.  Patient asking if ultrasound results from 2015 would be helpful.  They are in care everywhere under the other results tab.

## 2025-03-05 NOTE — TELEPHONE ENCOUNTER
Sonja sending message to note abdomen \"twitching.\"  This occurred when lying down last night. She states she was flat on her back and felt lower abdomen twitching and could visualize the area fluttering. She states it was like a baby kicking. She denies cramping or pain. This happened twice last night. This morning she felt a slight flutter, but today it was not strong enough to physically see a flutter.   She has tubal ligation.   Reviewed this may be muscle spasm. Reviewed hydration and motrin if bothersome.     To Sandra Caban to review and advise.

## 2025-03-11 ENCOUNTER — TELEPHONE (OUTPATIENT)
Dept: OBGYN CLINIC | Facility: CLINIC | Age: 40
End: 2025-03-11

## 2025-03-11 NOTE — TELEPHONE ENCOUNTER
Reviewed ultrasound. Please inform patient her fibroid has increased in size since last ultrasound in 2022- now 8.4cm and may have a submucosal component.  Due to her menstrual symptoms and other symptoms recommend consult with MD quintanilla Result    Narrative   PROCEDURE: US PELVIS TRANSABDOMINAL AND TRANSVAGINAL (CPT=76856/28938)     COMPARISON: None.     INDICATIONS: Menorrhagia with regular cycle     TECHNIQUE: Pelvic ultrasound using transabdominal and transvaginal technique.  A transvaginal scan was performed for endometrial and adnexal evaluation     FINDINGS: Enlarged lobulated fibroid uterus measuring 10.6 x 8.2 x 10.0 cm     ENDOMETRIUM: Thickened endometrium in the fundal region measuring 18.7 mm.  Mild submucosal effacement of the endometrium in the mid body and lower uterine segment  MYOMETRIUM: Large central heterogeneous uterine leiomyoma measuring 8.4 x 6.6 x 6.7 cm involving the body and fundus     OVARIES AND ADNEXA:     RIGHT:   Measures 3.7 x 2.4 x 3.2 cm.  Complex corpus luteum measures 3.9 x 3.7 cm.  LEFT:   Measures 4.1 x 2.3 x 2.4 cm Small subcentimeter physiologic follicles.  Benign follicular cyst measures 1.8 x 1.5 cm.     CUL-DE-SAC:   Normal.  No free fluid or mass.    OTHER: Negative.  Bladder appears normal.                 Impression   CONCLUSION:  1. Enlarged lobulated fibroid uterus.  2. Large central heterogeneous uterine leiomyoma measures 8.4 x 6.6 x 6.7 cm.    3. Thickened endometrium in the fundal region measuring 18.7 mm with mild submucosal effacement of the endometrium in the body and lower uterine segment.           Dictated by (CST): Christopher Monge MD on 3/10/2025 at 3:37 PM      Finalized by (CST): Christopher Monge MD on 3/10/2025 at 3:44 PM

## 2025-03-11 NOTE — TELEPHONE ENCOUNTER
Pt informed of EMBs recs and verbalized understanding. Pt stated she would prefer to see female MD. Next available 20 min slot with any female MD is with GODFREY in [place] on 4/21. Pt would prefer to be seen earlier then this.     Message to GODFREY, FABIANA, DIANA, and ABDELRAHMAN if pt can be added sooner then 4/21 for consult.

## 2025-04-23 NOTE — PROGRESS NOTES
The following individual(s) verbally consented to be recorded using ambient AI listening technology and understand that they can each withdraw their consent to this listening technology at any point by asking the clinician to turn off or pause the recording:    Patient name: Sonja Collins  Additional names:  BARRIE

## 2025-04-23 NOTE — PROGRESS NOTES
Sonja Collins    1985     History of Present Illness  Sonja Collins is a 39 year old female with fibroids who presents for discussion of ultrasound results and potential surgical options. She is here with her .    She has a history of fibroids, which were present during her pregnancies. Initially, she had two fibroids that grew during her first pregnancy, making it high risk. Post-pregnancy, she  for an extended period and did not have periods for a while. When her periods resumed, they were initially light with minimal cramping.    Over the past ten months, her menstrual bleeding has progressively worsened, becoming very heavy. She needs to change tampons in less than an hour during the heaviest days, which last about one and a half days, with her total period lasting five days. The remaining days involve moderate bleeding. She uses tampons and period pants for protection.    We reviewed her ultrasound report and it revealed an 8 cm fibroid on the posterior uterus, which is pushing into the uterine lining, causing heavy bleeding. She has a history of fibroids shrinking post-pregnancy.  We discussed  that this particular fibroid has grown significantly from 1 cm in  to 8 cm currently.    She experiences significant pain, including tailbone pain and cramping, which affects her ability to exercise and perform daily activities. The pain is sometimes dull and sometimes strong, impacting her quality of life.    She feels tired and suspects anemia due to the heavy bleeding, although she does not recall being told she is anemic. She has had her blood count checked recently but does not remember the results. Chart reviewed and her hgb in feb of this year was 13.0.       Chief Complaint   Patient presents with    Consult     DISCUSS ultrasound RESULTS       Past Medical History[1]    Past Surgical History[2]     Pap Date: 25  Pap Result Notes: Neg Pap/HPV  Follow Up Recommendation:  Annual  2/27/25 EMB      Medications Ordered Prior to Encounter[3]    Birth control method:tubal ligation      PHYSICAL EXAM:       Constitutional: well developed, well nourished  Head/Face: normocephalic  Psychiatric:  Oriented to time, place, person and situation. Appropriate mood and affect    Pelvic Exam:  deferred    Results      PROCEDURE: US PELVIS TRANSABDOMINAL AND TRANSVAGINAL (CPT=76856/65151)     COMPARISON: None.     INDICATIONS: Menorrhagia with regular cycle     TECHNIQUE: Pelvic ultrasound using transabdominal and transvaginal technique.  A transvaginal scan was performed for endometrial and adnexal evaluation     FINDINGS: Enlarged lobulated fibroid uterus measuring 10.6 x 8.2 x 10.0 cm     ENDOMETRIUM: Thickened endometrium in the fundal region measuring 18.7 mm.  Mild submucosal effacement of the endometrium in the mid body and lower uterine segment  MYOMETRIUM: Large central heterogeneous uterine leiomyoma measuring 8.4 x 6.6 x 6.7 cm involving the body and fundus     OVARIES AND ADNEXA:     RIGHT:   Measures 3.7 x 2.4 x 3.2 cm.  Complex corpus luteum measures 3.9 x 3.7 cm.  LEFT:   Measures 4.1 x 2.3 x 2.4 cm Small subcentimeter physiologic follicles.  Benign follicular cyst measures 1.8 x 1.5 cm.     CUL-DE-SAC:   Normal.  No free fluid or mass.    OTHER: Negative.  Bladder appears normal.                 Impression   CONCLUSION:  1. Enlarged lobulated fibroid uterus.  2. Large central heterogeneous uterine leiomyoma measures 8.4 x 6.6 x 6.7 cm.    3. Thickened endometrium in the fundal region measuring 18.7 mm with mild submucosal effacement of the endometrium in the body and lower uterine segment.           Dictated by (CST): Christopher Monge MD on 3/10/2025 at 3:37 PM            ICD-10-CM    1. Menorrhagia with regular cycle  N92.0       2. Submucous uterine fibroid  D25.0            Assessment & Plan  Uterine fibroids  uterine fibroid, 8 cm, causing submucosal distortion, severe  menorrhagia, cramping, tailbone pain. Surgical options discussed: myomectomy and hysterectomy. Myomectomy risks: bleeding, infection, fibroid regrowth, adhesions. Hysterectomy may have fewer bleeding risks, with possible ovarian preservation. Robotic-assisted myomectomy considered for less invasiveness. She is evaluating options.  - Consider myomectomy or hysterectomy based on her preference.  - Discuss robotic-assisted surgery with my partners if considering myomectomy.  - Educate on surgical risks and benefits, including adhesions and infection with myomectomy.  - Allow time for decision-making and schedule surgery for summer if decided.  - Advise no driving for two weeks post-surgery.  - Advise no sexual activity for eight weeks post-surgery.                  [1]   Past Medical History:   Anxiety    Dyspareunia    Fibroids    Hypertension   [2]   Past Surgical History:  Procedure Laterality Date    Insert intrauterine device      Remove intrauterine device      Tubal ligation     [3]   Current Outpatient Medications on File Prior to Visit   Medication Sig Dispense Refill    spironolactone 50 MG Oral Tab Take 1 tablet (50 mg total) by mouth daily.       No current facility-administered medications on file prior to visit.

## 2025-04-24 NOTE — TELEPHONE ENCOUNTER
OB GYN SURGICAL SCHEDULING    Assessment: menorrhagia, submucosal fibroid    Pre-Operative Procedure:  Salpingectomy Bilateral and Total Laprascopic Hysterectomy    Admission:  Day Surgery    Anesthesia: General    Additional Orders:  Routine Orders, patient needs to be aware that if fibroid too large to remove laparoscopy that there is a small chance that it may have to be converted to an open procedure.  This is true for all laparoscopic cases.      Comments / Orders to Nurse: will need to see her for a preop appointment at least 2 weeks before surgery    Discussed possible complications including but not limited to:  Alternatives to surgical intervention discussed with patient in detail., Likely consequences of not undergoing procedure discussed with patient., anesthesia risks, bleeding, infection, injury, bowel / bladder, injury, internal, postop DVT / PE, postop pneumonia, surgery may not improve symptoms, and wound dehiscence

## 2025-04-24 NOTE — TELEPHONE ENCOUNTER
Message to Dr. Corado.  See [employer] message regarding decision.  Patient would like a hysterectomy.

## 2025-04-24 NOTE — TELEPHONE ENCOUNTER
Spoke to patient. Aware surgery has been rescheduled to Mon,6/9/25 at 730a. Assisted with providing patient with consult appointment.     Will come in tomorrow to fill hysterectomy form.    To Dr. Li: please advise if available to assist    Updates instructions sent    Updated delayed staff message sent to MD    Updated book and calendar

## 2025-04-24 NOTE — TELEPHONE ENCOUNTER
Per verbal conversation with Dr. Corado OK to add on Mon,8/11/25.    Spoke to patient. Aware surgery is scheduled on Mon,8/11/25 at 730am. Patient aware depending on fibroid size case might be converted to an open procedure and verbalized understandings.     Assisted in scheduling consult appointment. With Dr. Corado 2weeks prior to surgery.    To Dr. Li: please advise if available to assist    Aware must come in and sign hysterectomy form no later than 1 before surgery  In order to check if prior authorization Is needed prior to surgery.    Major case and antimicrobial wash instructions sent via [employer]     Major case and antimicribial wash instructions sent via [employer]    Delayed staff message sent to MD to please place pre-op orders    Entered in book and calendar

## 2025-05-05 NOTE — PROGRESS NOTES
Sonja Collins    1985     History of Present Illness  Sonja Collins is a 39 year old female with migraines and fibroids who presents for a pre-operative consultation for a scheduled hysterectomy.    She has been experiencing a persistent migraine for the past seven days. Her migraines typically occur around her menstrual cycle, usually a day or two before her period starts, and they usually resolve once her flow begins. However, this time the headache has persisted despite the onset of her period. Her period arrived earlier than expected and was notably heavy. She has been monitoring her blood pressure at home, which has not been elevated. I advised her to see her pcp because her BP is elevated today.  She took her BP medication only shortly before she arrived today when she should have taken it in the morning.    She is scheduled for a hysterectomy on  due to a large fibroid. She is concerned about the timing of the surgery in relation to her anniversary on , as she was planning a renewal ceremony.     PSH:  She has a history of a tubal ligation performed in , which involved burning and cutting the tubes.                Chief Complaint   Patient presents with    Consult     Surgery next month and has had a migraine for 7 days - per PT       Past Medical History[1]none    Past Surgical History[2]Tubal ligation     Pap Date: 25  Pap Result Notes: Neg Pap/HPV  Follow Up Recommendation: Annual  25 EMB      Medications Ordered Prior to Encounter[3]    Birth control method:tubal ligation      PHYSICAL EXAM:       Constitutional: well developed, well nourished  Head/Face: normocephalic  Psychiatric:  Oriented to time, place, person and situation. Appropriate mood and affect    Pelvic Exam:  deferred    Results    US PELVIS (TRANSABDOMINAL AND TRANSVAGINAL) (CPT=76856/79523)  Narrative: PROCEDURE: US PELVIS TRANSABDOMINAL AND TRANSVAGINAL (CPT=76856/61854)      COMPARISON: None.     INDICATIONS: Menorrhagia with regular cycle     TECHNIQUE: Pelvic ultrasound using transabdominal and transvaginal technique.  A transvaginal scan was performed for endometrial and adnexal evaluation     FINDINGS:   Enlarged lobulated fibroid uterus measuring 10.6 x 8.2 x 10.0 cm     ENDOMETRIUM: Thickened endometrium in the fundal region measuring 18.7 mm.  Mild submucosal effacement of the endometrium in the mid body and lower uterine segment   MYOMETRIUM: Large central heterogeneous uterine leiomyoma measuring 8.4 x 6.6 x 6.7 cm involving the body and fundus      OVARIES AND ADNEXA:      RIGHT:   Measures 3.7 x 2.4 x 3.2 cm.  Complex corpus luteum measures 3.9 x 3.7 cm.   LEFT:   Measures 4.1 x 2.3 x 2.4 cm Small subcentimeter physiologic follicles.  Benign follicular cyst measures 1.8 x 1.5 cm.     CUL-DE-SAC:   Normal.  No free fluid or mass.    OTHER: Negative.  Bladder appears normal.                Impression: CONCLUSION:    1. Enlarged lobulated fibroid uterus.  2. Large central heterogeneous uterine leiomyoma measures 8.4 x 6.6 x 6.7 cm.    3. Thickened endometrium in the fundal region measuring 18.7 mm with mild submucosal effacement of the endometrium in the body and lower uterine segment.           Dictated by (CST): Christopher Monge MD on 3/10/2025 at 3:37 PM       Finalized by (CST): Christopher Monge MD on 3/10/2025 at 3:44 PM                      ICD-10-CM    1. Fibroids, submucosal  D25.0       2. Pelvic pain  R10.2       3. Menorrhagia with regular cycle  N92.0            Assessment & Plan  Menorrhagia due to submucosal fibroid  Menorrhagia attributed to submucosal fibroid causing significant pain and heavy bleeding. Hysterectomy scheduled for June 9. Discussed surgical risks, including 0.028% risk of ureteral injury, eight-week recovery, and potential intraoperative method change.  - Proceed with scheduled hysterectomy on June 9.  - Discuss potential for earlier  surgery date if feasible.  - Provided pre-operative instructions and consent forms.  - Discussed post-operative care and recovery expectations.  - Consider pelvic floor physical therapy post-recovery.  - Ensure adequate post-operative pain management.  - Advise on dietary recommendations including probiotics, protein, and fiber intake during recovery.      Migraine  Migraines associated with menstrual cycles, potentially exacerbated by fibroid. Advised to manage with medication at symptom onset. Caffeine and Tylenol suggested as initial treatments.  - Advise taking two extra strength Tylenol and caffeine at the onset of a migraine.  - Discuss migraine management with primary care provider for potential preventive medication.                [1]   Past Medical History:   Anxiety    Dyspareunia    Fibroids    Hypertension   [2]   Past Surgical History:  Procedure Laterality Date    Insert intrauterine device      Remove intrauterine device      Tubal ligation     [3]   Current Outpatient Medications on File Prior to Visit   Medication Sig Dispense Refill    spironolactone 50 MG Oral Tab Take 1 tablet (50 mg total) by mouth daily.       No current facility-administered medications on file prior to visit.

## 2025-05-15 NOTE — TELEPHONE ENCOUNTER
Patient called wants to speak with a nurse regarding her upcoming surgery she ask if someone would give her a call back

## 2025-05-15 NOTE — TELEPHONE ENCOUNTER
Patient informed of Dr. Corado's recommendations and verbalized understanding.   The patient's goals for the shift include      The clinical goals for the shift include Pt will remain free from falls throughout this shift.    Problem: Fall/Injury  Goal: Not fall by end of shift  Outcome: Progressing  Goal: Be free from injury by end of the shift  Outcome: Progressing  Goal: Verbalize understanding of personal risk factors for fall in the hospital  Outcome: Progressing  Goal: Verbalize understanding of risk factor reduction measures to prevent injury from fall in the home  Outcome: Progressing  Goal: Use assistive devices by end of the shift  Outcome: Progressing  Goal: Pace activities to prevent fatigue by end of the shift  Outcome: Progressing

## 2025-05-15 NOTE — TELEPHONE ENCOUNTER
Patient can see her pcp to rule out other possible causes of her hip pain and lower back pain as her uterus may not be causing this.

## 2025-06-09 NOTE — ANESTHESIA POSTPROCEDURE EVALUATION
Patient: Sonja Collins    Procedure Summary       Date: 06/09/25 Room / Location: [facility] MAIN OR 08 / [facility] MAIN OR    Anesthesia Start: 0738 Anesthesia Stop: 1058    Procedure: Total laparoscopic hysterectomy, bilateral salpingectomy (Abdomen) Diagnosis:       Menorrhagia with regular cycle      Fibroids, submucosal      (Menorrhagia with regular cycle [N92.0]Fibroids, submucosal [D25.0])    Surgeons: Juana Corado MD Anesthesiologist: Sofie Osman MD    Anesthesia Type: general ASA Status: 2            Anesthesia Type: general    Vitals Value Taken Time   /97 06/09/25 11:36   Temp 97.6 °F (36.4 °C) 06/09/25 11:00   Pulse 93 06/09/25 11:37   Resp 11 06/09/25 11:37   SpO2 99 % 06/09/25 11:37   Vitals shown include unfiled device data.    EM AN Post Evaluation:   Patient Evaluated in PACU  Patient Participation: complete - patient participated  Level of Consciousness: awake  Pain Management: adequate  Airway Patency:patent  Yes    Nausea/Vomiting: none  Cardiovascular Status: acceptable  Respiratory Status: acceptable  Postoperative Hydration acceptable      Sofie Osman MD  6/9/2025 11:37 AM

## 2025-06-09 NOTE — ANESTHESIA PREPROCEDURE EVALUATION
Anesthesia PreOp Note    HPI:     Sonja Collins is a 39 year old female who presents for preoperative consultation requested by: Juana Corado MD    Date of Surgery: 2025    Procedure(s):  Total laparoscopic hysterectomy, bilateral salpingectomy  Indication: Menorrhagia with regular cycle [N92.0]  Fibroids, submucosal [D25.0]    Relevant Problems   No relevant active problems       NPO:  Last Liquid Consumption Date: 25  Last Liquid Consumption Time: 2200  Last Solid Consumption Date: 25  Last Solid Consumption Time: 1700  Last Liquid Consumption Date: 25          History Review:  Patient Active Problem List    Diagnosis Date Noted    Menorrhagia with regular cycle 2025    Fibroids, submucosal 2025    Hypertension 2022    Fibroids 2022    Uterine fibroid complicating  care, baby not yet delivered in second trimester (HCC) 2015       Past Medical History[1]    Past Surgical History[2]    Prescriptions Prior to Admission[3]  Current Medications and Prescriptions Ordered in Epic[4]    Allergies[5]    Family History[6]  Social Hx on file[7]    Available pre-op labs reviewed.  Lab Results   Component Value Date    URINEPREG Negative 2025             Vital Signs:  Body mass index is 23.18 kg/m².   height is 1.702 m (5' 7\") and weight is 67.1 kg (148 lb). Her oral temperature is 98.1 °F (36.7 °C). Her blood pressure is 142/90 and her pulse is 95. Her respiration is 16 and oxygen saturation is 100%.   Vitals:    25 1547 25 0613   BP:  142/90   Pulse:  95   Resp:  16   Temp:  98.1 °F (36.7 °C)   TempSrc:  Oral   SpO2:  100%   Weight: 65.8 kg (145 lb) 67.1 kg (148 lb)   Height: 1.702 m (5' 7\") 1.702 m (5' 7\")        Anesthesia Evaluation     Patient summary reviewed and Nursing notes reviewed    No history of anesthetic complications   Airway   Mallampati: I  TM distance: >3 FB  Neck ROM: full  Dental      Pulmonary - negative ROS    Cardiovascular - normal exam  (+) hypertension    Neuro/Psych    (+)  anxiety/panic attacks,        GI/Hepatic/Renal      Endo/Other    Abdominal                  Anesthesia Plan:   ASA:  2  Plan:   General  Informed Consent Plan and Risks Discussed With:  Patient      I have informed Sonja Collins and/or legal guardian or family member of the nature of the anesthetic plan, benefits, risks including possible dental damage if relevant, major complications, and any alternative forms of anesthetic management.   All of the patient's questions were answered to the best of my ability. The patient desires the anesthetic management as planned.  Sofie Osman MD  6/9/2025 7:14 AM  Present on Admission:   Fibroids, submucosal   Menorrhagia with regular cycle           [1]   Past Medical History:   Anxiety    Dyspareunia    Fibroids    Hypertension    Migraines   [2]   Past Surgical History:  Procedure Laterality Date    Insert intrauterine device      Remove intrauterine device      Tubal ligation     [3]   Medications Prior to Admission   Medication Sig Dispense Refill Last Dose/Taking    verapamil 80 MG Oral Tab Take 1 tablet (80 mg total) by mouth 3 (three) times daily.   6/9/2025 at  3:00 AM    hydrOXYzine 25 MG Oral Tab Take 1 tablet (25 mg total) by mouth every 8 (eight) hours as needed for Anxiety.   6/8/2025 at 11:00 PM    amoxicillin clavulanate 875-125 MG Oral Tab Take 1 tablet by mouth 2 (two) times daily.   6/7/2025    spironolactone 50 MG Oral Tab Take 1 tablet (50 mg total) by mouth daily.   6/8/2025 at  9:00 AM    SUMAtriptan 25 MG Oral Tab Take 1 tablet (25 mg total) by mouth every 2 (two) hours as needed.   Unknown   [4]   Current Facility-Administered Medications Ordered in Epic   Medication Dose Route Frequency Provider Last Rate Last Admin    lactated ringers infusion   Intravenous Continuous Juana Corado MD 20 mL/hr at 06/09/25 0622 New Bag at 06/09/25 0622     No current [facility]-ordered outpatient  medications on file.   [5] No Known Allergies  [6]   Family History  Problem Relation Age of Onset    Diabetes Father     Hypertension Father     Hypertension Mother     Lipids Mother     No Known Problems Daughter     No Known Problems Son     Heart Disease Paternal Grandmother     No Known Problems Brother     Breast Cancer Neg     Ovarian Cancer Neg     Pancreatic Cancer Neg    [7]   Social History  Socioeconomic History    Marital status:    Tobacco Use    Smoking status: Never   Vaping Use    Vaping status: Never Used   Substance and Sexual Activity    Alcohol use: Yes     Alcohol/week: 1.0 standard drink of alcohol     Types: 1 Glasses of wine per week     Comment: OCC    Drug use: Never    Sexual activity: Yes     Partners: Male

## 2025-06-09 NOTE — ANESTHESIA PROCEDURE NOTES
Airway  Date/Time: 6/9/2025 7:46 AM  Reason: Elective    Airway not difficult    General Information and Staff   Patient location during procedure: OR  Anesthesiologist: Sofie Osman MD  Performed: anesthesiologist   Performed by: Sofie Osman MD  Authorized by: Sofie Osman MD        Indications and Patient Condition  Indications for airway management: anesthesia  Sedation level: deep      Preoxygenated: yesPatient position: sniffing    Mask difficulty assessment: 1 - vent by mask    Final Airway Details    Final airway type: endotracheal airway    Successful airway: ETT  Cuffed: yes   Successful intubation technique: Video laryngoscopy  Facilitating devices/methods: intubating stylet  Endotracheal tube insertion site: oral  Blade: GlideScope  Blade size: #3  ETT size (mm): 7.0    Cormack-Lehane Classification: grade I - full view of glottis  Placement verified by: capnometry   Measured from: teeth  Number of attempts at approach: 1

## 2025-06-09 NOTE — DISCHARGE SUMMARY
[facility]  part of [facility]    Discharge Summary    Sonja Collins Patient Status:  Outpatient in a Bed    1985 MRN [id number]   Location [facility] POST ANESTHESIA CARE UNIT Attending Juana Corado MD   Hosp Day # 0 PCP JHONATHAN HOFFMAN     Date of Admission: 2025   Date of Discharge: 6/10/2025    Admitting Diagnosis: Menorrhagia with regular cycle [N92.0]  Fibroids, submucosal [D25.0]  S/P laparoscopic hysterectomy    Disposition: Home and Self - care    Discharge Diagnosis: .Active Problems:    Fibroids, submucosal    Menorrhagia with regular cycle    S/P laparoscopic hysterectomy      Hospital Course:   Reason for Admission: laparoscopic hysterectomy    Discharge Physical Exam: General appearance:  alert, appears stated age and cooperative  INCISION/WOUND: clean, dry and intact, no evidence of infection and no evidence of cellulitis  Pulmonary: clear to auscultation bilaterally  Cardiovascular: S1, S2 normal, no murmur, click, rub or gallop, regular rate and rhythm  Abdominal: soft, appropriately tender; bowel sounds normal  Pelvic: no vaginal bleeding  Skin: Skin color, texture, turgor normal. No rashes or lesions  Neurologic: Grossly normal    Hospital Course: unremarkable, pt discharged home in stable condition    Complications: None      Surgical Procedures       Case IDs Date Procedure Surgeon Location Status    [id number] 25 Total laparoscopic hysterectomy, bilateral salpingectomy Juana Corado MD [facility] MAIN OR [facility]          Pending Labs       Order Current Status    Specimen to Pathology Tissue Collected (25 1030)            Discharge Plan:   Discharge Condition: Good    Current Discharge Medication List        Home Meds - Unchanged    Details   verapamil 80 MG Oral Tab Take 1 tablet (80 mg total) by mouth 3 (three) times daily.      hydrOXYzine 25 MG Oral Tab Take 1 tablet (25 mg total) by mouth every 8 (eight) hours as needed for Anxiety.       amoxicillin clavulanate 875-125 MG Oral Tab Take 1 tablet by mouth 2 (two) times daily.      spironolactone 50 MG Oral Tab Take 1 tablet (50 mg total) by mouth daily.      SUMAtriptan 25 MG Oral Tab Take 1 tablet (25 mg total) by mouth every 2 (two) hours as needed.                 Discharge Diet: As tolerated and General diet    Discharge Activity: Pelvic rest until cleared, No Driving until cleared, May shower, and nothing per vagina for 8 weeks    Follow up:       2 weeks postop visit        Juana Corado MD  6/9/2025

## 2025-06-09 NOTE — H&P
[facility]  part of [facility]    Gyne History & Physical    Sonja Collins Patient Status:  Outpatient in a Bed    1985 MRN [id number]   Location [facility] OPERATING ROOM Attending Juana Corado MD   Hosp Day # 0 PCP JHONATHAN HOFFMAN     Date of Admission: 2025    HPI:   Reason for Admission:  Total Laparoscopic Hysterectomy/BS    History of Present Illness:  Patient is a(n) 39 year old  female Patient's last menstrual period was 2025 (exact date). who presented with submucosal fibroid and heavy menses.  Her fibroid is large 8 cm- significantly larger than on her pelvic ultrasound done in  when it was only 1cm.  We discussed risks,benefits and possible complications of myomectomy vs hysterectomy and patient opted for hysterectomy due to no desire for future childbearing.  We also discussed that we may need to convert to an open hysterectomy if the large fibroid makes laparoscopic surgery not possible.  Patient consented to procedure.  Postop course also discussed in detail including risks of DVT,pneumonia, poor wound healing and need for future surgery.      Allergies/Medications:   Allergies:  Allergies[1]     Medications:  Prescriptions Prior to Admission[2]  History     Past Medical History:  Past Medical History[3]    Past Surgical History:  Past Surgical History[4]    Past OB History:  OB History    Para Term  AB Living   2 2 2   2   SAB IAB Ectopic Multiple Live Births       2      # Outcome Date GA Lbr Robert/2nd Weight Sex Type Anes PTL Lv   2 Term     F NORMAL SPONT   LORENZO   1 Term     M NORMAL SPONT   LORENZO        Past GYN History:       No history of abnormal paps or stds    Social History:  Social History     Tobacco Use    Smoking status: Never    Smokeless tobacco: Not on file   Substance Use Topics    Alcohol use: Yes     Alcohol/week: 1.0 standard drink of alcohol     Types: 1 Glasses of wine per week     Comment: OCC         Review of Systems:     no breast pain or new or enlarging lumps on self exam and cyclic withdrawal menses only  Cardiovascular: denies, edema, chest pain, orthopnea, and palpitations  Respiratory: denies, sputum production, pleuritic pain, shortness of breath, and wheezing  Neurologic: denies and pre-existing deficit  Psychiatric: past h/o anxiety- no meds    Physical Exam:      No intake or output data in the 24 hours ending 06/08/25 2106      Constitutional: feeling well, no fever, chills or malaise  Abdomen:  Soft, non tender  Genitourinary: deferred for EUA  Psychiatric: calm, alert and oriented x 3      Results:   Laboratory:  No results found for this or any previous visit (from the past 24 hours).  Lab Results   Component Value Date    WBC 5.8 02/27/2025    RBC 4.28 02/27/2025    HGB 13.0 02/27/2025    HCT 38.8 02/27/2025    MCV 90.7 02/27/2025    MCH 30.4 02/27/2025    MCHC 33.5 02/27/2025    RDW 14.1 02/27/2025    .0 02/27/2025      Component  Ref Range & Units 5/25/25  4:49 PM   Sodium  133 - 144 mmol/L 136   Potassium  3.5 - 5.1 mmol/L 4.3   Comment: Slight hemolysis is present at a level that could interfere with the result   Chloride  98 - 107 mmol/L 103   Carbon Dioxide  21.0 - 31.0 mmol/L 23.2   Anion Gap  6 - 15 mmol/L 10   BUN  7.0 - 25.0 mg/dL 14.5   Creatinine  0.60 - 1.20 mg/dL 0.84   Glucose  70 - 99 mg/dL 83   Calcium  8.6 - 10.3 mg/dL 9.2   AST  13 - 39 U/L 26   Comment: Slight hemolysis is present at a level that could interfere with the result   ALT  7 - 52 U/L 14   Alkaline Phosphatase  34 - 104 U/L 57   Protein, Total  6.4 - 8.9 g/dL 7.7   Albumin  3.50 - 5.70 g/dL 4.5   Bilirubin, Total  0.20 - 1.20 mg/dL 0.44   eGFR  >=60.0 mL/min/ 90.8             Diagnostics:  No results found.    PROCEDURE: US PELVIS TRANSABDOMINAL AND TRANSVAGINAL (CPT=76856/80364)     COMPARISON: None.     INDICATIONS: Menorrhagia with regular cycle     TECHNIQUE: Pelvic ultrasound using transabdominal  and transvaginal technique.  A transvaginal scan was performed for endometrial and adnexal evaluation     FINDINGS: Enlarged lobulated fibroid uterus measuring 10.6 x 8.2 x 10.0 cm     ENDOMETRIUM: Thickened endometrium in the fundal region measuring 18.7 mm.  Mild submucosal effacement of the endometrium in the mid body and lower uterine segment  MYOMETRIUM: Large central heterogeneous uterine leiomyoma measuring 8.4 x 6.6 x 6.7 cm involving the body and fundus     OVARIES AND ADNEXA:     RIGHT:   Measures 3.7 x 2.4 x 3.2 cm.  Complex corpus luteum measures 3.9 x 3.7 cm.  LEFT:   Measures 4.1 x 2.3 x 2.4 cm Small subcentimeter physiologic follicles.  Benign follicular cyst measures 1.8 x 1.5 cm.     CUL-DE-SAC:   Normal.  No free fluid or mass.    OTHER: Negative.  Bladder appears normal.                 Impression   CONCLUSION:  1. Enlarged lobulated fibroid uterus.  2. Large central heterogeneous uterine leiomyoma measures 8.4 x 6.6 x 6.7 cm.    3. Thickened endometrium in the fundal region measuring 18.7 mm with mild submucosal effacement of the endometrium in the body and lower uterine segment.               Assessment/Plan:     No problems updated.    Problem   Menorrhagia With Regular Cycle   Fibroids, Submucosal        Plan:  Total Laparoscopic Hysterectomy/Bilateral Salpingectomy, possible CELSA    All of the findings and plan were discussed with the patient.  She notes understanding and agrees with the plan of care.  All questions were answered to the best of my ability at this time.    Juana Corado MD  6/8/2025  9:06 PM         [1] No Known Allergies  [2]   No medications prior to admission.   [3]   Past Medical History:   Anxiety    Dyspareunia    Fibroids    Hypertension    Migraines   [4]   Past Surgical History:  Procedure Laterality Date    Insert intrauterine device      Remove intrauterine device      Tubal ligation

## 2025-06-10 NOTE — PLAN OF CARE
Problem: Patient Centered Care  Goal: Patient preferences are identified and integrated in the patient's plan of care  Description: Interventions:  - What would you like us to know as we care for you?   - Provide timely, complete, and accurate information to patient/family  - Incorporate patient and family knowledge, values, beliefs, and cultural backgrounds into the planning and delivery of care  - Encourage patient/family to participate in care and decision-making at the level they choose  - Honor patient and family perspectives and choices  Outcome: Progressing     Problem: Patient/Family Goals  Goal: Patient/Family Long Term Goal  Description: Patient's Long Term Goal:     Interventions:  -   - See additional Care Plan goals for specific interventions  Outcome: Progressing  Goal: Patient/Family Short Term Goal  Description: Patient's Short Term Goal:     Interventions:   -   - See additional Care Plan goals for specific interventions  Outcome: Progressing     Problem: PAIN - ADULT  Goal: Verbalizes/displays adequate comfort level or patient's stated pain goal  Description: INTERVENTIONS:  - Encourage pt to monitor pain and request assistance  - Assess pain using appropriate pain scale  - Administer analgesics based on type and severity of pain and evaluate response  - Implement non-pharmacological measures as appropriate and evaluate response  - Consider cultural and social influences on pain and pain management  - Manage/alleviate anxiety  - Utilize distraction and/or relaxation techniques  - Monitor for opioid side effects  - Notify MD/LIP if interventions unsuccessful or patient reports new pain  - Anticipate increased pain with activity and pre-medicate as appropriate  Outcome: Progressing     Problem: SAFETY ADULT - FALL  Goal: Free from fall injury  Description: INTERVENTIONS:  - Assess pt frequently for physical needs  - Identify cognitive and physical deficits and behaviors that affect risk of  falls.  - [place] fall precautions as indicated by assessment.  - Educate pt/family on patient safety including physical limitations  - Instruct pt to call for assistance with activity based on assessment  - Modify environment to reduce risk of injury  - Provide assistive devices as appropriate  - Consider OT/PT consult to assist with strengthening/mobility  - Encourage toileting schedule  Outcome: Progressing     Problem: GASTROINTESTINAL - ADULT  Goal: Minimal or absence of nausea and vomiting  Description: INTERVENTIONS:  - Maintain adequate hydration with IV or PO as ordered and tolerated  - Nasogastric tube to low intermittent suction as ordered  - Evaluate effectiveness of ordered antiemetic medications  - Provide nonpharmacologic comfort measures as appropriate  - Advance diet as tolerated, if ordered  - Obtain nutritional consult as needed  - Evaluate fluid balance  Outcome: Progressing     Problem: SKIN/TISSUE INTEGRITY - ADULT  Goal: Incision(s), wounds(s) or drain site(s) healing without S/S of infection  Description: INTERVENTIONS:  - Assess and document risk factors for pressure ulcer development  - Assess and document skin integrity  - Assess and document dressing/incision, wound bed, drain sites and surrounding tissue  - Implement wound care per orders  - Initiate isolation precautions as appropriate  - Initiate Pressure Ulcer prevention bundle as indicated  Outcome: Progressing

## 2025-06-10 NOTE — PROGRESS NOTES
[facility]  part of [facility]    Progress Note    Sonja Collins Patient Status:  Outpatient in a Bed    1985 MRN [id number]   Location [facility] Attending Juana Corado MD   Hosp Day # 0 PCP JHONATHAN HOFFMAN       Subjective:   Sonja Collisn is feeling slightly nauseous but better than yesterday, tolerating gen diet and able to ambulate.  +belching , no flatus      Review of Systems:   10 point ROS completed and was negative, except for pertinent positive and negatives stated in subjective.    Objective:     Vitals:    25 1936 06/10/25 0008 06/10/25 0434 06/10/25 0745   BP: 148/88 139/90  130/80   BP Location: Left arm Left arm  Right arm   Pulse: 93 92  93   Resp: 16 16 16 16   Temp: 97.3 °F (36.3 °C)   98.4 °F (36.9 °C)   TempSrc: Oral Oral Oral Oral   SpO2: 98% 96%  96%   Weight:       Height:         Patient Weight for the past 72 hrs:   Weight   25 0613 148 lb (67.1 kg)       Intake/Output Summary (Last 24 hours) at 6/10/2025 1228  Last data filed at 6/10/2025 1018  Gross per 24 hour   Intake 680 ml   Output 2300 ml   Net -1620 ml         GENERAL:  The patient appeared to be in no distress and was comfortable.  SKIN:  Warm and hydrated  PSYCHIATRIC: Calm and cooperative   HEENT:  Head was atraumatic and normocephalic.    CHEST:  Symmetrical movement on inspiration  LUNGS:  No audible wheezing  ABDOMEN: Non-distended, NT, bandages clean and dry, tegaderm intact  MUSCULOSKELETAL:  There was no deformity.  There was full range of motion in all the extremities.   EXTREMITIES: There was no edema  NEUROLOGICAL:  There was no focal deficit.      Current Scheduled Inpatient Meds:      spironolactone  50 mg Oral Daily    verapamil  80 mg Oral TID       Current PRN Inpatient Meds:        ondansetron **OR** ondansetron    oxyCODONE **OR** oxyCODONE    HYDROmorphone **OR** HYDROmorphone    polyethylene glycol (PEG 3350)    sennosides    bisacodyl    fleet  enema    Results:     Lab Results   Component Value Date    WBC 18.9 (H) 06/10/2025    HGB 9.9 (L) 06/10/2025    HCT 29.8 (L) 06/10/2025    .0 06/10/2025    T4F 0.9 04/25/2025    TSH 0.435 (L) 04/25/2025       Recent Labs   Lab 06/10/25  0525   RBC 3.30*   HGB 9.9*   HCT 29.8*   MCV 90.3   MCH 30.0   MCHC 33.2   RDW 13.8   NEPRELIM 15.45*   WBC 18.9*   .0     No results for input(s): \"GLU\", \"BUN\", \"CREATSERUM\", \"GFRAA\", \"GFRNAA\", \"CA\", \"ALB\", \"NA\", \"K\", \"CL\", \"CO2\", \"ALKPHO\", \"AST\", \"ALT\", \"BILT\", \"TP\" in the last 168 hours.  No results found for: \"PT\", \"INR\"    Culture:  No results found for this visit on 06/09/25.    Imaging/EKG:   No results found.      Assessment and Plan:           S/P laparoscopic hysterectomy  POD#1    PLan:  discharge  home today- follow up in 2 weeks in office, start slow fe for anemia.              Juana Corado MD

## 2025-06-10 NOTE — DISCHARGE INSTRUCTIONS
No heavy lifting or straining  Deep breathe  Ambulate frequently  Drink lots of fluids  Take ibuprofen and oxycodone as needed for pain

## 2025-06-11 NOTE — TELEPHONE ENCOUNTER
Pt had Total laparoscopic hysterectomy, bilateral salpingectomy with CAP on 6/9 and needs 2 week post op appt.     No appts with CAP the week of 6/23. Message to CAP to please advise.

## 2025-06-12 NOTE — TELEPHONE ENCOUNTER
Please see 6/11 telephone encounter.    Patient with total laparoscopic hysterectomy, bilateral salpingectomy with Dr. Corado on 6/9. She is reaching out for 2 week post-op appointment.     No current openings the week of 6/23. Message to Dr. Corado to please advise where this patient can be added, thank you.

## 2025-06-13 NOTE — TELEPHONE ENCOUNTER
Patient reaching out re: 2 week post op appointment.  Please see below message    To Dr. Corado for recommendations

## 2025-06-17 NOTE — TELEPHONE ENCOUNTER
Message to Dr. Corado. Please review UA. Also please advise if patient can be added for 2 week post op the week of 6/23?

## 2025-06-18 NOTE — TELEPHONE ENCOUNTER
Left message to call back   Can offer tomorrow 6/19 at 10:20 am, however there is a chance she would be rescheduled if Dr. Corado cancels tomorrow.

## 2025-06-18 NOTE — TELEPHONE ENCOUNTER
Patient was to be seen today for post op complications. Due to family emergency CAP had to leave and patient not seen.

## 2025-06-18 NOTE — TELEPHONE ENCOUNTER
Patient had surgery on Monday and 6/19 does work for the patient and still missing a 2-week appointment to be scheduled.  Will 6/19 be her 2-week checkup?    Can patient see someone else.

## 2025-06-18 NOTE — TELEPHONE ENCOUNTER
Called Sonja,  She is comfortable waiting for 2 week post-op, does not feel she needs to be seen tomorrow.   See 6/12 Onsite Care message.  I asked her to let us know if urethral pain or rectal pain severe. She does have hx of hemorrhoids and will continue with OTC treatment. Denies constipation.     She still need 2 week postop appointment week of 6/23. To Dr. Corado to advise.

## 2025-06-19 NOTE — TELEPHONE ENCOUNTER
Added Sonja 6/24 at 5:40 pm for post-op.     She is having urethral pain since around 6/13. UA was essentially normal, only showed trace protein. Sonja is asking if possible for Dr. Corado to treat her for a UTI? She read online that you can have UTI without any bacteria. See attached photo of that information.     To Dr. Corado to advise.

## 2025-06-24 NOTE — TELEPHONE ENCOUNTER
Patient called. Patient states she had a scary moment. Patient felt like she had to vomit, dizziness and felt like passing out. Patient had blurry vision and seemed to have trouble speaking. Patient took a baby aspirin as a precaution.  Holli call.

## 2025-06-24 NOTE — PROGRESS NOTES
The following individual(s) verbally consented to be recorded using ambient AI listening technology and understand that they can each withdraw their consent to this listening technology at any point by asking the clinician to turn off or pause the recording:    Patient name: Sonja Collins

## 2025-06-24 NOTE — TELEPHONE ENCOUNTER
Sonja walker, [facility] She states after getting out of the shower, she cracked her bathroom door for some air. She was fixing her hair when she noted nausea, vision became disorted, she became lightheaded wanted to pass out. She laid down and called her  via her Christina. She states she felt her she was \"talking funny\" and her tongue was thick. She took a baby aspirin.     Sonja speech appears clear. She denies any asymmetry to her face, even with smiling. She is able to ambulate w/o issue and use all four extremities. She is asking if she should be seen sooner or go to ER.     We discussed possible hypotensive occurrence due to heat of shower; possible vasovagal.   Encouraged to keep appointment, have someone drive her- is  Move slowing changing position  Rest, hydrate and have a snack until she has to leave for appointment.     Sonja states understanding.

## 2025-07-03 NOTE — PROGRESS NOTES
Sonja Collins is a 40 year old female  Patient's last menstrual period was 2025 (exact date).   Chief Complaint   Patient presents with    Post-Op     HYSTERECTOMY AND BILATERAL SALPINGECTOMY// FELT WEAK WAS ABOUT TO FAINT HEARING MUFFLED AND SPEECH SLURRED TONGUE HEAVY   .   History of Present Illness  Sonja Collins is a 39 year old female who presents for a post-operative follow-up after a hysterectomy.    She is experiencing ongoing constipation despite using stool softeners, consuming fruit in the morning, and taking milk of magnesia every two days. MiraLAX was ineffective even when taken daily. Her bowel movements are soft when they occur, and she is drinking plenty of water. She is currently taking a slow-release iron supplement once daily in the morning to address her anemia and is concerned about its impact on her constipation.    She had a recent episode where she felt tired, nauseous, and experienced blurry vision and muffled hearing after a warm shower. She has a history of vasovagal syncope and anemia. She lay down until her symptoms improved and contacted her  for assistance.    Post-surgery, she reports a 'dent' in her abdomen. She experiences twinges of pain and rectal pressure when urinating or sitting on the toilet. She is not taking any pain medication like acetaminophen due to concerns about constipation. She reports spasms or discomfort in her lower abdomen when eating, which she associates with gas and constipation. She is cautious about physical activity and is engaging in light activities like cleaning, as long as they do not strain her abdominal muscles.       OBSTETRICS HISTORY:  OB History    Para Term  AB Living   2 2 2 0 0 2   SAB IAB Ectopic Multiple Live Births   0 0 0 0 2       GYNE HISTORY:   S/p hysterectomy     History   Sexual Activity    Sexual activity: Yes    Partners: Male       MEDICAL HISTORY:  Past Medical History:    Anxiety     Dyspareunia    Fibroids    Hypertension    Migraines       SURGICAL HISTORY:  Past Surgical History:   Procedure Laterality Date    Insert intrauterine device      Remove intrauterine device      Tubal ligation          SOCIAL HISTORY:  Social History     Socioeconomic History    Marital status:    Tobacco Use    Smoking status: Never   Vaping Use    Vaping status: Never Used   Substance and Sexual Activity    Alcohol use: Yes     Alcohol/week: 1.0 standard drink of alcohol     Types: 1 Glasses of wine per week     Comment: OCC    Drug use: Never    Sexual activity: Yes     Partners: Male        MEDICATIONS:    Current Outpatient Medications:     Ferrous Sulfate Dried ER (SLOW RELEASE IRON) 160 (50 Fe) MG Oral Tab CR, Take 160 mg by mouth daily., Disp: 30 tablet, Rfl: 1    verapamil 80 MG Oral Tab, Take 1 tablet (80 mg total) by mouth 3 (three) times daily., Disp: , Rfl:     hydrOXYzine 25 MG Oral Tab, Take 1 tablet (25 mg total) by mouth every 8 (eight) hours as needed for Anxiety., Disp: , Rfl:     spironolactone 50 MG Oral Tab, Take 1 tablet (50 mg total) by mouth daily., Disp: , Rfl:     oxyCODONE 5 MG Oral Tab, Take 1 tablet (5 mg total) by mouth every 6 (six) hours as needed (severe pain). (Patient not taking: Reported on 6/24/2025), Disp: 15 tablet, Rfl: 0    ibuprofen 600 MG Oral Tab, Take 1 tablet (600 mg total) by mouth every 6 (six) hours as needed for Pain (mild to moderate pain). (Patient not taking: Reported on 6/24/2025), Disp: 30 tablet, Rfl: 0    SUMAtriptan 25 MG Oral Tab, Take 1 tablet (25 mg total) by mouth every 2 (two) hours as needed. (Patient not taking: Reported on 6/24/2025), Disp: , Rfl:     ALLERGIES:  No Known Allergies      Review of Systems:  Constitutional:  Denies fatigue, night sweats, hot flashes  Cardiovascular:  denies chest pain or palpitations  Respiratory:  denies shortness of breath  Gastrointestinal:  denies heartburn, abdominal pain, diarrhea or  constipation  Genitourinary:  denies dysuria, incontinence, abnormal vaginal discharge, vaginal itching  Musculoskeletal:  denies back pain.  Skin/Breast:  Denies any breast pain, lumps, or discharge.   Neurological:  denies headaches, extremity weakness or numbness.  Psychiatric: denies depression or anxiety.  Endocrine:   denies excessive thirst or urination.  Heme/Lymph:  denies history of anemia, easy bruising or bleeding.      PHYSICAL EXAM:   Constitutional: well developed, well nourished  Head/Face: normocephalic  Abdomen:  soft, nontender, nondistended, no masses Incisions clean / dry / intact, no erythema nor discharge, healing well  Extremities: no edema, no cyanosis  Psychiatric:  Oriented to time, place, person and situation. Appropriate mood and affect    Pelvic Exam: deferred    Assessment & Plan:  Sonja was seen today for post-op.    Diagnoses and all orders for this visit:    Postop check          Sonja Collins had no medications administered during this visit.    Rtc in 4 weeks for f/u

## 2025-07-26 NOTE — TELEPHONE ENCOUNTER
Sonja calling with concern for incision site infections.   S/P lap hyst with Dr. Corado on 6/9/25.     Sonja calling to report that she noticed bump on right incision and belly button. She thought bump in belly button was a keloid. Yesterday, right incision popped on its own and yellow pus came out. She decided to gently squeeze bump on belly button and this also popped with yellow pus.   Left lower incision appears well healed, but does have a little knot where the suture is.   She showered and cleansed incisions, then applied rubbing alcohol.   Today right incision is no longer draining. Belly button has a white head on it today.   Despite the pus, she states that overall the incision all seem to be healing well. She denies redness, warmth, swelling or pain, aside from local irritation with rubbing clothes.     Sonja is attaching photos in [employer] now.   She has post-op with Dr. Corado on Tuesday.     Patient is asking if oral or topical antibiotic needed prior to Tuesday appointment, or okay to monitor?  To Dr. Fan on call to advise.

## 2025-07-26 NOTE — TELEPHONE ENCOUNTER
Patient is calling in she had a hysterectomy on 06/09/2025.  Yesterday the incision started leaking fluid.   Patient request for a nurse to call for guidance

## 2025-07-26 NOTE — TELEPHONE ENCOUNTER
Discussed with Dr. Fan-nothing to do at this time. Keep Tuesday appointment.     Reviewed with Sonja. Recommendations given to avoid squeezing. Keep clean and dry until visit. Patient verbalized understanding.

## (undated) NOTE — LETTER
MAJOR CASE PREOPERATIVE INSTRUCTIONS    4/24/2025      Dear Sonja,    Your are having a Total Laparoscopic Hysterectomy-Bilateral Salpingectomy on Mon,8/11/25 at 7:30am at [facility].    Do not eat or drink anything (including water) after midnight the night before surgery.    Only take in clear liquids on Sunday, the day before surgery.  Some examples of clear liquids are water, coffee/tea without milk/cream, clear broth, apple juice, or any juice that you can see through, jello, popsicles, lemon ice, or clear soda like ginger ale or 7-up.    Please review and follow the attached Preoperative Antimicrobial Wash/Bath Instructions.    You are to call this office if you have any cold or flu symptoms 2 days before your scheduled surgery.    Please avoid ALL aspirin and herbal supplements 7 days before surgery.  Avoid Ibuprofen, Motrin, Aleve, or Naprosyn for 3 days before surgery.    Please avoid ALL Cannabis use 24 hours prior to surgery.    You cannot wear hair pins,wigs,artificial nail or metallic nails/ nail polish for surgery.    You will be contacted by PreAdmission Testing (PAT) usually within the week before surgery.  They will take a short medical history and let you know if any preoperative testing is needed. If you have any questions for preadmission testing please feel free to contact them by calling [phone].    Friendly reminder to please come in and sign the Hysterectomy form no later than a month before surgery.    Call our office now to schedule your post-operative appointment for 4 weeks after surgery.    Please feel free to contact our office at [phone] if you have any questions regarding these instructions or your procedure.      Sincerely,          Juana Corado MD  [facility] - OB/GYN  [address] [place] [id number] [phone]

## (undated) NOTE — LETTER
MAJOR CASE PREOPERATIVE INSTRUCTIONS    4/24/2025      Dear Sonja,    Your are having a Total Laparoscopic Hysterectomy-Bilateral Salpingectomy on Mon,6/9/25 at 7:30am at [facility].    Do not eat or drink anything (including water) after midnight the night before surgery.    Only take in clear liquids on Sunday, the day before surgery.  Some examples of clear liquids are water, coffee/tea without milk/cream, clear broth, apple juice, or any juice that you can see through, jello, popsicles, lemon ice, or clear soda like ginger ale or 7-up.    Please review and follow the attached Preoperative Antimicrobial Wash/Bath Instructions.    You are to call this office if you have any cold or flu symptoms 2 days before your scheduled surgery.    Please avoid ALL aspirin and herbal supplements 7 days before surgery.  Avoid Ibuprofen, Motrin, Aleve, or Naprosyn for 3 days before surgery.    Please avoid ALL Cannabis use 24 hours prior to surgery.    You cannot wear hair pins,wigs,artificial nail or metallic nails/ nail polish for surgery.    You will be contacted by PreAdmission Testing (PAT) usually within the week before surgery.  They will take a short medical history and let you know if any preoperative testing is needed. If you have any questions for preadmission testing please feel free to contact them by calling [phone].    Friendly reminder to please come in and sign the Hysterectomy form no later than a month before surgery.    Call our office now to schedule your post-operative appointment for 4 weeks after surgery.    Please feel free to contact our office at [phone] if you have any questions regarding these instructions or your procedure.      Sincerely,          Juana Corado MD  [facility] - OB/GYN  [address] [place] [id number] [phone]

## (undated) NOTE — LETTER
INSTRUCTIONS FOR PRE-SURGICAL   ANTIMICROBIAL BATH/SHOWER    Your doctor has recommended a pre-surgical CHG (chlorhexidine gluconate) shower/bath with Betasept (also sold as Hibiclens).  It reduces bacteria that can potentially cause infection.  Betasept is available at [facility] and usually at your local retail pharmacy.    The average adult will use a total of 6 to 10 ounces for three baths.  That is approximately two 4 oz. Bottles.  Depending on individual size, weight and number of treatments, the amount may vary.    IMPORTANT! Read ALL instructions BEFORE starting.     AVOID these areas:  Head: hair, scalp, eyes, ears, nose and mouth  Genital area (inner vaginal and inner rectal)  All open wounds (including surgical incisions)    CONCENTRATE on these areas:  Under arms  Under breast tissue  Between skin folds  Hair bearing areas of groin and between buttocks    DIRECTIONS:  Take your usual shower or bath, rinse  Pour two ounces of Betasept (or Hibiclens) onto moist washcloth  Avoiding head, wash gently (does not foam)  Let sit on skin for three minutes  Rinse completely with water and towel dry    Repeat bath/shower for three consecutive days:  First bath... Two nights before the day of surgery.  Second bath... The night before surgery.  Third bath... The morning of surgery.  Do not apply lotions, deodorants or powder after the last bath.    DISCARD REMAINING PRODUCT AFTER LAST BATH!    DRUG FACTS    Active Ingredient: Chlorhexidine gluconae solution 4%        Warnings:  For external use only.  Do not use:  If you are allergic to chlorhexidine gluconate or any other ingredients listed on the label  As a pre-surgical cleanser of head or face    STOP USE and notify doctor if :  Irritation or allergic reaction occurs  If contact occurs in eyes, ears, mouth, rinse immediately with cold water.    Keep out of reach of children.  If swallowed get medica help or contact Poison Control Center.   Store between 60-80 degrees F.    Fabric Warning!  CHG WILL STAIN YOUR FABRICS!  Use with care around shower curtains, towels washcloths rugs and clothes.  Wipe surfaces immediately if accidentally splashed.      Laundering Instructions:  CHG skin cleanser will cause stains if used with chlorinated products.  Rinse immediately and completely and use only non-chlorine detergents.